# Patient Record
Sex: MALE | Race: WHITE | NOT HISPANIC OR LATINO | Employment: FULL TIME | URBAN - METROPOLITAN AREA
[De-identification: names, ages, dates, MRNs, and addresses within clinical notes are randomized per-mention and may not be internally consistent; named-entity substitution may affect disease eponyms.]

---

## 2017-08-10 ENCOUNTER — OFFICE VISIT - HEALTHEAST (OUTPATIENT)
Dept: FAMILY MEDICINE | Facility: CLINIC | Age: 37
End: 2017-08-10

## 2017-08-10 DIAGNOSIS — K21.9 GERD (GASTROESOPHAGEAL REFLUX DISEASE): ICD-10-CM

## 2017-08-10 DIAGNOSIS — R03.0 ELEVATED BLOOD PRESSURE READING WITHOUT DIAGNOSIS OF HYPERTENSION: ICD-10-CM

## 2017-08-10 DIAGNOSIS — R06.83 SNORING: ICD-10-CM

## 2017-08-10 DIAGNOSIS — E66.9 OBESITY (BMI 30-39.9): ICD-10-CM

## 2017-08-10 RX ORDER — LANSOPRAZOLE 30 MG/1
30 CAPSULE, DELAYED RELEASE ORAL DAILY PRN
Status: SHIPPED | COMMUNITY
Start: 2017-08-10 | End: 2022-10-31

## 2018-09-05 ENCOUNTER — OFFICE VISIT - HEALTHEAST (OUTPATIENT)
Dept: FAMILY MEDICINE | Facility: CLINIC | Age: 38
End: 2018-09-05

## 2018-09-05 DIAGNOSIS — S40.812A: ICD-10-CM

## 2018-09-05 DIAGNOSIS — S46.912A LEFT SHOULDER STRAIN, INITIAL ENCOUNTER: ICD-10-CM

## 2018-09-05 DIAGNOSIS — S30.811A ABRASION OF ABDOMINAL WALL: ICD-10-CM

## 2018-10-10 ENCOUNTER — OFFICE VISIT - HEALTHEAST (OUTPATIENT)
Dept: FAMILY MEDICINE | Facility: CLINIC | Age: 38
End: 2018-10-10

## 2018-10-10 DIAGNOSIS — H93.11 TINNITUS, RIGHT: ICD-10-CM

## 2018-10-10 DIAGNOSIS — R03.0 ELEVATED BLOOD PRESSURE READING WITHOUT DIAGNOSIS OF HYPERTENSION: ICD-10-CM

## 2018-10-10 DIAGNOSIS — H69.91 DYSFUNCTION OF RIGHT EUSTACHIAN TUBE: ICD-10-CM

## 2018-10-17 ENCOUNTER — AMBULATORY - HEALTHEAST (OUTPATIENT)
Dept: NURSING | Facility: CLINIC | Age: 38
End: 2018-10-17

## 2018-10-30 ENCOUNTER — OFFICE VISIT - HEALTHEAST (OUTPATIENT)
Dept: FAMILY MEDICINE | Facility: CLINIC | Age: 38
End: 2018-10-30

## 2018-10-30 DIAGNOSIS — Z13.220 LIPID SCREENING: ICD-10-CM

## 2018-10-30 DIAGNOSIS — I10 BENIGN ESSENTIAL HYPERTENSION: ICD-10-CM

## 2018-10-30 DIAGNOSIS — R31.29 MICROSCOPIC HEMATURIA: ICD-10-CM

## 2018-10-30 LAB
ALBUMIN SERPL-MCNC: 4.2 G/DL (ref 3.5–5)
ALBUMIN UR-MCNC: NEGATIVE MG/DL
ALP SERPL-CCNC: 58 U/L (ref 45–120)
ALT SERPL W P-5'-P-CCNC: 34 U/L (ref 0–45)
ANION GAP SERPL CALCULATED.3IONS-SCNC: 8 MMOL/L (ref 5–18)
APPEARANCE UR: CLEAR
AST SERPL W P-5'-P-CCNC: 27 U/L (ref 0–40)
BACTERIA #/AREA URNS HPF: ABNORMAL HPF
BILIRUB SERPL-MCNC: 0.5 MG/DL (ref 0–1)
BILIRUB UR QL STRIP: NEGATIVE
BUN SERPL-MCNC: 12 MG/DL (ref 8–22)
CALCIUM SERPL-MCNC: 9.7 MG/DL (ref 8.5–10.5)
CHLORIDE BLD-SCNC: 105 MMOL/L (ref 98–107)
CO2 SERPL-SCNC: 26 MMOL/L (ref 22–31)
COLOR UR AUTO: YELLOW
CREAT SERPL-MCNC: 0.95 MG/DL (ref 0.7–1.3)
GFR SERPL CREATININE-BSD FRML MDRD: >60 ML/MIN/1.73M2
GLUCOSE BLD-MCNC: 91 MG/DL (ref 70–125)
GLUCOSE UR STRIP-MCNC: NEGATIVE MG/DL
HBA1C MFR BLD: 5.7 % (ref 3.5–6)
HGB UR QL STRIP: ABNORMAL
KETONES UR STRIP-MCNC: NEGATIVE MG/DL
LEUKOCYTE ESTERASE UR QL STRIP: NEGATIVE
NITRATE UR QL: NEGATIVE
PH UR STRIP: 6 [PH] (ref 5–8)
POTASSIUM BLD-SCNC: 4.2 MMOL/L (ref 3.5–5)
PROT SERPL-MCNC: 6.9 G/DL (ref 6–8)
RBC #/AREA URNS AUTO: ABNORMAL HPF
SODIUM SERPL-SCNC: 139 MMOL/L (ref 136–145)
SP GR UR STRIP: 1.02 (ref 1–1.03)
SQUAMOUS #/AREA URNS AUTO: ABNORMAL LPF
UROBILINOGEN UR STRIP-ACNC: ABNORMAL
WBC #/AREA URNS AUTO: ABNORMAL HPF

## 2018-10-30 ASSESSMENT — MIFFLIN-ST. JEOR: SCORE: 2218.27

## 2018-11-05 ENCOUNTER — COMMUNICATION - HEALTHEAST (OUTPATIENT)
Dept: FAMILY MEDICINE | Facility: CLINIC | Age: 38
End: 2018-11-05

## 2018-11-13 ENCOUNTER — AMBULATORY - HEALTHEAST (OUTPATIENT)
Dept: NURSING | Facility: CLINIC | Age: 38
End: 2018-11-13

## 2018-11-13 ENCOUNTER — AMBULATORY - HEALTHEAST (OUTPATIENT)
Dept: LAB | Facility: CLINIC | Age: 38
End: 2018-11-13

## 2018-11-13 DIAGNOSIS — I10 BENIGN ESSENTIAL HYPERTENSION: ICD-10-CM

## 2018-11-13 DIAGNOSIS — R31.29 MICROSCOPIC HEMATURIA: ICD-10-CM

## 2018-11-13 DIAGNOSIS — Z13.220 LIPID SCREENING: ICD-10-CM

## 2018-11-13 LAB
ALBUMIN UR-MCNC: NEGATIVE MG/DL
ANION GAP SERPL CALCULATED.3IONS-SCNC: 10 MMOL/L (ref 5–18)
APPEARANCE UR: CLEAR
BACTERIA #/AREA URNS HPF: ABNORMAL HPF
BILIRUB UR QL STRIP: NEGATIVE
BUN SERPL-MCNC: 13 MG/DL (ref 8–22)
CALCIUM SERPL-MCNC: 9.5 MG/DL (ref 8.5–10.5)
CHLORIDE BLD-SCNC: 104 MMOL/L (ref 98–107)
CHOLEST SERPL-MCNC: 201 MG/DL
CO2 SERPL-SCNC: 26 MMOL/L (ref 22–31)
COLOR UR AUTO: YELLOW
CREAT SERPL-MCNC: 1.02 MG/DL (ref 0.7–1.3)
FASTING STATUS PATIENT QL REPORTED: YES
GFR SERPL CREATININE-BSD FRML MDRD: >60 ML/MIN/1.73M2
GLUCOSE BLD-MCNC: 88 MG/DL (ref 70–125)
GLUCOSE UR STRIP-MCNC: NEGATIVE MG/DL
HDLC SERPL-MCNC: 40 MG/DL
HGB UR QL STRIP: ABNORMAL
KETONES UR STRIP-MCNC: NEGATIVE MG/DL
LDLC SERPL CALC-MCNC: 144 MG/DL
LEUKOCYTE ESTERASE UR QL STRIP: NEGATIVE
MUCOUS THREADS #/AREA URNS LPF: ABNORMAL LPF
NITRATE UR QL: NEGATIVE
PH UR STRIP: 5.5 [PH] (ref 5–8)
POTASSIUM BLD-SCNC: 4.4 MMOL/L (ref 3.5–5)
RBC #/AREA URNS AUTO: ABNORMAL HPF
SODIUM SERPL-SCNC: 140 MMOL/L (ref 136–145)
SP GR UR STRIP: 1.02 (ref 1–1.03)
SQUAMOUS #/AREA URNS AUTO: ABNORMAL LPF
TRIGL SERPL-MCNC: 85 MG/DL
UROBILINOGEN UR STRIP-ACNC: ABNORMAL
WBC #/AREA URNS AUTO: ABNORMAL HPF

## 2018-11-14 ENCOUNTER — AMBULATORY - HEALTHEAST (OUTPATIENT)
Dept: FAMILY MEDICINE | Facility: CLINIC | Age: 38
End: 2018-11-14

## 2018-11-14 ENCOUNTER — COMMUNICATION - HEALTHEAST (OUTPATIENT)
Dept: FAMILY MEDICINE | Facility: CLINIC | Age: 38
End: 2018-11-14

## 2018-11-14 DIAGNOSIS — R31.29 MICROSCOPIC HEMATURIA: ICD-10-CM

## 2018-11-14 LAB — PSA SERPL-MCNC: 0.4 NG/ML (ref 0–2.5)

## 2018-11-15 LAB
LAB AP CHARGES (HE HISTORICAL CONVERSION): NORMAL
LAB MED GENERAL PATH INTERP (HE HISTORICAL CONVERSION): NORMAL
PATH REPORT.COMMENTS IMP SPEC: NORMAL
PATH REPORT.FINAL DX SPEC: NORMAL
PATH REPORT.MICROSCOPIC SPEC OTHER STN: NORMAL
PATH REPORT.RELEVANT HX SPEC: NORMAL
RESULT FLAG (HE HISTORICAL CONVERSION): NORMAL
SPECIMEN DESCRIPTION: NORMAL

## 2018-11-19 ENCOUNTER — COMMUNICATION - HEALTHEAST (OUTPATIENT)
Dept: FAMILY MEDICINE | Facility: CLINIC | Age: 38
End: 2018-11-19

## 2018-11-19 ENCOUNTER — AMBULATORY - HEALTHEAST (OUTPATIENT)
Dept: FAMILY MEDICINE | Facility: CLINIC | Age: 38
End: 2018-11-19

## 2018-11-19 DIAGNOSIS — R31.29 MICROSCOPIC HEMATURIA: ICD-10-CM

## 2018-11-21 ENCOUNTER — AMBULATORY - HEALTHEAST (OUTPATIENT)
Dept: NURSING | Facility: CLINIC | Age: 38
End: 2018-11-21

## 2018-11-21 ENCOUNTER — COMMUNICATION - HEALTHEAST (OUTPATIENT)
Dept: FAMILY MEDICINE | Facility: CLINIC | Age: 38
End: 2018-11-21

## 2018-12-18 ENCOUNTER — RECORDS - HEALTHEAST (OUTPATIENT)
Dept: ADMINISTRATIVE | Facility: OTHER | Age: 38
End: 2018-12-18

## 2018-12-29 ENCOUNTER — COMMUNICATION - HEALTHEAST (OUTPATIENT)
Dept: FAMILY MEDICINE | Facility: CLINIC | Age: 38
End: 2018-12-29

## 2019-02-05 ENCOUNTER — OFFICE VISIT - HEALTHEAST (OUTPATIENT)
Dept: FAMILY MEDICINE | Facility: CLINIC | Age: 39
End: 2019-02-05

## 2019-02-05 ENCOUNTER — COMMUNICATION - HEALTHEAST (OUTPATIENT)
Dept: FAMILY MEDICINE | Facility: CLINIC | Age: 39
End: 2019-02-05

## 2019-02-05 DIAGNOSIS — J01.90 ACUTE SINUSITIS WITH SYMPTOMS GREATER THAN 10 DAYS: ICD-10-CM

## 2019-02-12 ENCOUNTER — COMMUNICATION - HEALTHEAST (OUTPATIENT)
Dept: FAMILY MEDICINE | Facility: CLINIC | Age: 39
End: 2019-02-12

## 2019-02-12 ENCOUNTER — AMBULATORY - HEALTHEAST (OUTPATIENT)
Dept: FAMILY MEDICINE | Facility: CLINIC | Age: 39
End: 2019-02-12

## 2019-02-13 ENCOUNTER — AMBULATORY - HEALTHEAST (OUTPATIENT)
Dept: NURSING | Facility: CLINIC | Age: 39
End: 2019-02-13

## 2019-04-03 ENCOUNTER — RECORDS - HEALTHEAST (OUTPATIENT)
Dept: ADMINISTRATIVE | Facility: OTHER | Age: 39
End: 2019-04-03

## 2019-05-15 ENCOUNTER — COMMUNICATION - HEALTHEAST (OUTPATIENT)
Dept: FAMILY MEDICINE | Facility: CLINIC | Age: 39
End: 2019-05-15

## 2019-05-15 DIAGNOSIS — I10 BENIGN ESSENTIAL HYPERTENSION: ICD-10-CM

## 2019-07-10 ENCOUNTER — COMMUNICATION - HEALTHEAST (OUTPATIENT)
Dept: FAMILY MEDICINE | Facility: CLINIC | Age: 39
End: 2019-07-10

## 2019-07-10 DIAGNOSIS — I10 BENIGN ESSENTIAL HYPERTENSION: ICD-10-CM

## 2019-09-09 ENCOUNTER — OFFICE VISIT - HEALTHEAST (OUTPATIENT)
Dept: FAMILY MEDICINE | Facility: CLINIC | Age: 39
End: 2019-09-09

## 2019-09-09 DIAGNOSIS — N52.2 DRUG-INDUCED ERECTILE DYSFUNCTION: ICD-10-CM

## 2019-09-09 DIAGNOSIS — R31.29 MICROSCOPIC HEMATURIA: ICD-10-CM

## 2019-09-09 DIAGNOSIS — Z00.01 ENCOUNTER FOR GENERAL ADULT MEDICAL EXAMINATION WITH ABNORMAL FINDINGS: ICD-10-CM

## 2019-09-09 DIAGNOSIS — I10 ESSENTIAL HYPERTENSION: ICD-10-CM

## 2019-09-09 DIAGNOSIS — R06.83 SNORING: ICD-10-CM

## 2019-09-09 DIAGNOSIS — E66.9 OBESITY (BMI 30-39.9): ICD-10-CM

## 2019-09-09 DIAGNOSIS — I10 BENIGN ESSENTIAL HYPERTENSION: ICD-10-CM

## 2019-09-09 DIAGNOSIS — D35.00 ADRENAL ADENOMA, UNSPECIFIED LATERALITY: ICD-10-CM

## 2019-09-09 LAB
ALBUMIN SERPL-MCNC: 4.1 G/DL (ref 3.5–5)
ALBUMIN UR-MCNC: NEGATIVE MG/DL
ALP SERPL-CCNC: 56 U/L (ref 45–120)
ALT SERPL W P-5'-P-CCNC: 33 U/L (ref 0–45)
AMORPH CRY #/AREA URNS HPF: ABNORMAL /[HPF]
ANION GAP SERPL CALCULATED.3IONS-SCNC: 10 MMOL/L (ref 5–18)
APPEARANCE UR: CLEAR
AST SERPL W P-5'-P-CCNC: 24 U/L (ref 0–40)
ATRIAL RATE - MUSE: 71 BPM
BACTERIA #/AREA URNS HPF: ABNORMAL HPF
BILIRUB SERPL-MCNC: 0.3 MG/DL (ref 0–1)
BILIRUB UR QL STRIP: NEGATIVE
BUN SERPL-MCNC: 13 MG/DL (ref 8–22)
CALCIUM SERPL-MCNC: 9.3 MG/DL (ref 8.5–10.5)
CHLORIDE BLD-SCNC: 106 MMOL/L (ref 98–107)
CHOLEST SERPL-MCNC: 205 MG/DL
CO2 SERPL-SCNC: 25 MMOL/L (ref 22–31)
COLOR UR AUTO: YELLOW
CORTIS SERPL-MCNC: 3.5 UG/DL
CREAT SERPL-MCNC: 1.19 MG/DL (ref 0.7–1.3)
DIASTOLIC BLOOD PRESSURE - MUSE: NORMAL MMHG
ERYTHROCYTE [DISTWIDTH] IN BLOOD BY AUTOMATED COUNT: 12.2 % (ref 11–14.5)
FASTING STATUS PATIENT QL REPORTED: ABNORMAL
GFR SERPL CREATININE-BSD FRML MDRD: >60 ML/MIN/1.73M2
GLUCOSE BLD-MCNC: 88 MG/DL (ref 70–125)
GLUCOSE UR STRIP-MCNC: NEGATIVE MG/DL
HBA1C MFR BLD: 5.4 % (ref 3.5–6)
HCT VFR BLD AUTO: 42.9 % (ref 40–54)
HDLC SERPL-MCNC: 47 MG/DL
HGB BLD-MCNC: 14.7 G/DL (ref 14–18)
HGB UR QL STRIP: ABNORMAL
INTERPRETATION ECG - MUSE: NORMAL
KETONES UR STRIP-MCNC: ABNORMAL MG/DL
LDLC SERPL CALC-MCNC: 133 MG/DL
LEUKOCYTE ESTERASE UR QL STRIP: NEGATIVE
MCH RBC QN AUTO: 29.9 PG (ref 27–34)
MCHC RBC AUTO-ENTMCNC: 34.2 G/DL (ref 32–36)
MCV RBC AUTO: 87 FL (ref 80–100)
MUCOUS THREADS #/AREA URNS LPF: ABNORMAL LPF
NITRATE UR QL: NEGATIVE
P AXIS - MUSE: 22 DEGREES
PH UR STRIP: 5.5 [PH] (ref 5–8)
PLATELET # BLD AUTO: 228 THOU/UL (ref 140–440)
PMV BLD AUTO: 6.9 FL (ref 7–10)
POTASSIUM BLD-SCNC: 4.3 MMOL/L (ref 3.5–5)
PR INTERVAL - MUSE: 146 MS
PROT SERPL-MCNC: 6.9 G/DL (ref 6–8)
QRS DURATION - MUSE: 94 MS
QT - MUSE: 392 MS
QTC - MUSE: 425 MS
R AXIS - MUSE: 25 DEGREES
RBC # BLD AUTO: 4.92 MILL/UL (ref 4.4–6.2)
RBC #/AREA URNS AUTO: ABNORMAL HPF
SODIUM SERPL-SCNC: 141 MMOL/L (ref 136–145)
SP GR UR STRIP: 1.02 (ref 1–1.03)
SQUAMOUS #/AREA URNS AUTO: ABNORMAL LPF
SYSTOLIC BLOOD PRESSURE - MUSE: NORMAL MMHG
T AXIS - MUSE: 34 DEGREES
TRIGL SERPL-MCNC: 125 MG/DL
UROBILINOGEN UR STRIP-ACNC: ABNORMAL
VENTRICULAR RATE- MUSE: 71 BPM
WBC #/AREA URNS AUTO: ABNORMAL HPF
WBC: 6.1 THOU/UL (ref 4–11)

## 2019-09-09 ASSESSMENT — MIFFLIN-ST. JEOR: SCORE: 2265.11

## 2019-10-01 ENCOUNTER — OFFICE VISIT - HEALTHEAST (OUTPATIENT)
Dept: SLEEP MEDICINE | Facility: CLINIC | Age: 39
End: 2019-10-01

## 2019-10-01 DIAGNOSIS — R29.818 SUSPECTED SLEEP APNEA: ICD-10-CM

## 2019-10-01 DIAGNOSIS — R06.83 SNORING: ICD-10-CM

## 2019-10-01 DIAGNOSIS — R53.82 CHRONIC FATIGUE: ICD-10-CM

## 2019-10-01 DIAGNOSIS — E66.01 MORBID OBESITY (H): ICD-10-CM

## 2019-10-01 ASSESSMENT — MIFFLIN-ST. JEOR: SCORE: 2269.31

## 2019-10-07 ENCOUNTER — COMMUNICATION - HEALTHEAST (OUTPATIENT)
Dept: FAMILY MEDICINE | Facility: CLINIC | Age: 39
End: 2019-10-07

## 2019-10-07 DIAGNOSIS — I10 BENIGN ESSENTIAL HYPERTENSION: ICD-10-CM

## 2019-10-21 ENCOUNTER — RECORDS - HEALTHEAST (OUTPATIENT)
Dept: SLEEP MEDICINE | Facility: CLINIC | Age: 39
End: 2019-10-21

## 2019-10-21 ENCOUNTER — RECORDS - HEALTHEAST (OUTPATIENT)
Dept: ADMINISTRATIVE | Facility: OTHER | Age: 39
End: 2019-10-21

## 2019-10-21 DIAGNOSIS — E66.01 MORBID (SEVERE) OBESITY DUE TO EXCESS CALORIES (H): ICD-10-CM

## 2019-10-21 DIAGNOSIS — R53.82 CHRONIC FATIGUE, UNSPECIFIED: ICD-10-CM

## 2019-10-21 DIAGNOSIS — R06.83 SNORING: ICD-10-CM

## 2019-10-21 DIAGNOSIS — R29.818 OTHER SYMPTOMS AND SIGNS INVOLVING THE NERVOUS SYSTEM: ICD-10-CM

## 2019-10-23 ENCOUNTER — COMMUNICATION - HEALTHEAST (OUTPATIENT)
Dept: SLEEP MEDICINE | Facility: CLINIC | Age: 39
End: 2019-10-23

## 2019-11-14 ENCOUNTER — COMMUNICATION - HEALTHEAST (OUTPATIENT)
Dept: SLEEP MEDICINE | Facility: CLINIC | Age: 39
End: 2019-11-14

## 2019-11-14 ENCOUNTER — AMBULATORY - HEALTHEAST (OUTPATIENT)
Dept: SLEEP MEDICINE | Facility: CLINIC | Age: 39
End: 2019-11-14

## 2019-11-14 DIAGNOSIS — G47.33 OBSTRUCTIVE SLEEP APNEA: ICD-10-CM

## 2019-11-19 ENCOUNTER — AMBULATORY - HEALTHEAST (OUTPATIENT)
Dept: SLEEP MEDICINE | Facility: CLINIC | Age: 39
End: 2019-11-19

## 2019-12-11 ENCOUNTER — COMMUNICATION - HEALTHEAST (OUTPATIENT)
Dept: FAMILY MEDICINE | Facility: CLINIC | Age: 39
End: 2019-12-11

## 2019-12-11 DIAGNOSIS — I10 BENIGN ESSENTIAL HYPERTENSION: ICD-10-CM

## 2019-12-12 ENCOUNTER — COMMUNICATION - HEALTHEAST (OUTPATIENT)
Dept: FAMILY MEDICINE | Facility: CLINIC | Age: 39
End: 2019-12-12

## 2020-01-11 ENCOUNTER — COMMUNICATION - HEALTHEAST (OUTPATIENT)
Dept: FAMILY MEDICINE | Facility: CLINIC | Age: 40
End: 2020-01-11

## 2020-01-11 DIAGNOSIS — I10 BENIGN ESSENTIAL HYPERTENSION: ICD-10-CM

## 2020-01-14 ENCOUNTER — COMMUNICATION - HEALTHEAST (OUTPATIENT)
Dept: FAMILY MEDICINE | Facility: CLINIC | Age: 40
End: 2020-01-14

## 2020-01-14 DIAGNOSIS — I10 ESSENTIAL HYPERTENSION: ICD-10-CM

## 2020-01-29 ENCOUNTER — OFFICE VISIT - HEALTHEAST (OUTPATIENT)
Dept: SLEEP MEDICINE | Facility: CLINIC | Age: 40
End: 2020-01-29

## 2020-01-29 DIAGNOSIS — G47.33 OBSTRUCTIVE SLEEP APNEA: ICD-10-CM

## 2020-01-29 DIAGNOSIS — G47.10 HYPERSOMNIA: ICD-10-CM

## 2020-01-29 ASSESSMENT — MIFFLIN-ST. JEOR: SCORE: 2301.97

## 2020-01-30 ENCOUNTER — COMMUNICATION - HEALTHEAST (OUTPATIENT)
Dept: SLEEP MEDICINE | Facility: CLINIC | Age: 40
End: 2020-01-30

## 2020-01-30 DIAGNOSIS — G47.33 OBSTRUCTIVE SLEEP APNEA: ICD-10-CM

## 2020-06-26 ENCOUNTER — OFFICE VISIT - HEALTHEAST (OUTPATIENT)
Dept: FAMILY MEDICINE | Facility: CLINIC | Age: 40
End: 2020-06-26

## 2020-06-26 DIAGNOSIS — S46.812A STRAIN OF SUPRASPINATUS MUSCLE OR TENDON, LEFT, INITIAL ENCOUNTER: ICD-10-CM

## 2020-06-26 DIAGNOSIS — M75.02 ADHESIVE CAPSULITIS OF LEFT SHOULDER: ICD-10-CM

## 2020-06-26 NOTE — ASSESSMENT & PLAN NOTE
Pain starting with uncomfortable movement.  Ongoing. Exam with adhesive capsulitis. Unlikely to be bony abnormality.  We will start with anti-inflammatories and PT/OT.  If not improving consider xray and/or injection (or referral to ortho).

## 2020-07-01 ENCOUNTER — OFFICE VISIT - HEALTHEAST (OUTPATIENT)
Dept: PHYSICAL THERAPY | Facility: REHABILITATION | Age: 40
End: 2020-07-01

## 2020-07-01 DIAGNOSIS — S46.812S STRAIN OF SUPRASPINATUS MUSCLE OR TENDON, LEFT, SEQUELA: ICD-10-CM

## 2020-07-01 DIAGNOSIS — M75.00 ADHESIVE CAPSULITIS OF SHOULDER, UNSPECIFIED LATERALITY: ICD-10-CM

## 2020-09-28 ENCOUNTER — COMMUNICATION - HEALTHEAST (OUTPATIENT)
Dept: FAMILY MEDICINE | Facility: CLINIC | Age: 40
End: 2020-09-28

## 2020-09-28 DIAGNOSIS — I10 BENIGN ESSENTIAL HYPERTENSION: ICD-10-CM

## 2020-10-02 ENCOUNTER — AMBULATORY - HEALTHEAST (OUTPATIENT)
Dept: LAB | Facility: CLINIC | Age: 40
End: 2020-10-02

## 2020-10-02 DIAGNOSIS — I10 ESSENTIAL HYPERTENSION: ICD-10-CM

## 2020-10-02 LAB
ANION GAP SERPL CALCULATED.3IONS-SCNC: 10 MMOL/L (ref 5–18)
BUN SERPL-MCNC: 16 MG/DL (ref 8–22)
CALCIUM SERPL-MCNC: 9.2 MG/DL (ref 8.5–10.5)
CHLORIDE BLD-SCNC: 102 MMOL/L (ref 98–107)
CO2 SERPL-SCNC: 27 MMOL/L (ref 22–31)
CREAT SERPL-MCNC: 1.09 MG/DL (ref 0.7–1.3)
GFR SERPL CREATININE-BSD FRML MDRD: >60 ML/MIN/1.73M2
GLUCOSE BLD-MCNC: 113 MG/DL (ref 70–125)
POTASSIUM BLD-SCNC: 4 MMOL/L (ref 3.5–5)
SODIUM SERPL-SCNC: 139 MMOL/L (ref 136–145)

## 2021-01-02 ENCOUNTER — COMMUNICATION - HEALTHEAST (OUTPATIENT)
Dept: SLEEP MEDICINE | Facility: CLINIC | Age: 41
End: 2021-01-02

## 2021-01-07 ENCOUNTER — COMMUNICATION - HEALTHEAST (OUTPATIENT)
Dept: FAMILY MEDICINE | Facility: CLINIC | Age: 41
End: 2021-01-07

## 2021-01-07 DIAGNOSIS — I10 BENIGN ESSENTIAL HYPERTENSION: ICD-10-CM

## 2021-01-08 RX ORDER — LOSARTAN POTASSIUM 100 MG/1
TABLET ORAL
Qty: 90 TABLET | Refills: 1 | Status: SHIPPED | OUTPATIENT
Start: 2021-01-08 | End: 2021-07-09

## 2021-01-29 DIAGNOSIS — G47.33 OBSTRUCTIVE SLEEP APNEA (ADULT) (PEDIATRIC): Primary | ICD-10-CM

## 2021-02-02 ENCOUNTER — DOCUMENTATION ONLY (OUTPATIENT)
Dept: SLEEP MEDICINE | Facility: CLINIC | Age: 41
End: 2021-02-02

## 2021-02-02 NOTE — PROGRESS NOTES
Patient came to virtual set up via telephone visit for mask fitting appointment on February 2, 2021. Patient requested to switch masks because N30I,Karishma locke,N20 . Discussed the following masks: N30I  Patient selected a Resmed Mask name: N30I Nasal mask size Standard fit pack.

## 2021-04-21 ENCOUNTER — OFFICE VISIT - HEALTHEAST (OUTPATIENT)
Dept: FAMILY MEDICINE | Facility: CLINIC | Age: 41
End: 2021-04-21

## 2021-04-21 DIAGNOSIS — J01.90 ACUTE SINUSITIS TREATED WITH ANTIBIOTICS IN THE PAST 60 DAYS: ICD-10-CM

## 2021-04-23 ENCOUNTER — COMMUNICATION - HEALTHEAST (OUTPATIENT)
Dept: FAMILY MEDICINE | Facility: CLINIC | Age: 41
End: 2021-04-23

## 2021-04-26 ENCOUNTER — COMMUNICATION - HEALTHEAST (OUTPATIENT)
Dept: FAMILY MEDICINE | Facility: CLINIC | Age: 41
End: 2021-04-26

## 2021-04-27 ENCOUNTER — COMMUNICATION - HEALTHEAST (OUTPATIENT)
Dept: FAMILY MEDICINE | Facility: CLINIC | Age: 41
End: 2021-04-27

## 2021-05-31 VITALS — BODY MASS INDEX: 36.18 KG/M2 | WEIGHT: 278 LBS

## 2021-06-01 NOTE — PROGRESS NOTES
Dear Dr. Mobley, Jasmin Royal, Do  [address] [place] [address]    Thank you for the opportunity to participate in the care of Mr. Shady Rocha.    He is a 39 y.o. male who comes to the clinic with a chief complaint of chronic fatigue that is been going on for 5 to 10 years.  The patient has been observed by his wife is having pauses in his breathing during sleep followed by loud snoring.  He showed me a recording of his snoring that he captured on his phone.  The patient's review of system is otherwise negative.     Ideal Sleep-Wake Cycle(devoid of societal pressure):    Patient would try to initiate sleep at around 11-12 at night with a sleep latency of 1-5 minutes. The patient would have 5 awakening. Final wake up time is around 7:30 AM.    Past Medical History  Past Medical History:   Diagnosis Date     Sinus infection 12/2016        Past Surgical History  Past Surgical History:   Procedure Laterality Date     DENTAL SURGERY          Meds  Current Outpatient Medications   Medication Sig Dispense Refill     doxazosin (CARDURA) 8 MG tablet Take 1 tablet (8 mg total) by mouth at bedtime. For blood pressure 30 tablet 1     lansoprazole (PREVACID) 30 MG capsule Take 30 mg by mouth daily as needed.       No current facility-administered medications for this visit.         Allergies  Sulfa (sulfonamide antibiotics)     Social History  Social History     Socioeconomic History     Marital status:      Spouse name: Not on file     Number of children: Not on file     Years of education: Not on file     Highest education level: Not on file   Occupational History     Not on file   Social Needs     Financial resource strain: Not on file     Food insecurity:     Worry: Not on file     Inability: Not on file     Transportation needs:     Medical: Not on file     Non-medical: Not on file   Tobacco Use     Smoking status: Never Smoker     Smokeless tobacco: Never Used   Substance and Sexual Activity      Alcohol use: Yes     Frequency: 2-3 times a week     Comment: 7 drinks per week     Drug use: Not on file     Sexual activity: Yes     Partners: Female   Lifestyle     Physical activity:     Days per week: Not on file     Minutes per session: Not on file     Stress: Not on file   Relationships     Social connections:     Talks on phone: Not on file     Gets together: Not on file     Attends Muslim service: Not on file     Active member of club or organization: Not on file     Attends meetings of clubs or organizations: Not on file     Relationship status: Not on file     Intimate partner violence:     Fear of current or ex partner: Not on file     Emotionally abused: Not on file     Physically abused: Not on file     Forced sexual activity: Not on file   Other Topics Concern     Not on file   Social History Narrative     Not on file        Family History  Family History   Problem Relation Age of Onset     Diabetes Paternal Grandmother      Hypertension Mother      Hypertension Father      Hypertension Maternal Grandmother      Coronary artery disease Maternal Grandfather      Hypertension Paternal Grandfather         Review of Systems:  Constitutional: Negative except as noted in HPI.   Eyes: Negative except as noted in HPI.   ENT: Negative except as noted in HPI.   Cardiovascular: Negative except as noted in HPI.   Respiratory: Negative except as noted in HPI.   Gastrointestinal: Negative except as noted in HPI.   Genitourinary: Negative except as noted in HPI.   Musculoskeletal: Negative except as noted in HPI.   Integumentary: Negative except as noted in HPI.   Neurological: Negative except as noted in HPI.   Psychiatric: Negative except as noted in HPI.   Endocrine: Negative except as noted in HPI.   Hematologic/Lymphatic: Negative except as noted in HPI.      STOP BANG 10/1/2019   Do you snore loudly (louder than talking or loud enough to be heard through closed doors)? 1   Do you often feel tired, fatigued,  "or sleepy during daytime? 0   Has anyone observed you stop breathing in your sleep? 0   Do you have or are you being treated for high blood pressure? 1   BMI more than 35 kg/m2 1   Age over 50 years old? 0   Neck circumference greater than 16 inches? 0   Gender male? 1   Total Score 4     Epworths Sleepiness Scale 10/1/2019   Sitting and reading 3   Watching TV 2   Sitting, inactive in a public place (e.g. a theatre or a meeting) 0   As a passenger in a car for an hour without a break 1   Lying down to rest in the afternoon when circumstances permit 2   Sitting and talking to someone 0   Sitting quietly after a lunch without alcohol 1   In a car, while stopped for a few minutes in traffic 0   Total score 9     Rooming 10/1/2019   Usual bedtime 1030   Sleep Latency 1-5mins   Awakenings 5   Wake Up Time 1030pm-7am   Weekends 11,   Energy Drinks n   Coffee x   Cola x   Difficulty falling asleep No   Difficulty staying asleep No   Excessive daytime tiredness No   Excessive daytime sleepiness No   Dozing off while driving No   Shift Worker No   Sleep Walking? No   Sleep Talking? No   Kicking or punching? No   Restless legs symptoms No       Physical Exam:  BP (!) 134/96 (Patient Site: Right Arm, Patient Position: Sitting, Cuff Size: Adult Large)   Pulse 84   Ht 6' 1\" (1.854 m)   Wt (!) 290 lb (131.5 kg)   SpO2 95%   BMI 38.26 kg/m    BMI:Body mass index is 38.26 kg/m .   GEN: NAD, obese  Head: Normocephalic.  EYES: PERRLA, EOMI  ENT: Oropharynx is clear, Marks class 4+ airway.   Nasal mucosa is moist without erythema  Neck : Thyroid is within normal limits. Neck Circ 16\"  CV: Regular rate and rhythm, S1 & S2 positive.  LUNGS: Bilateral breathsounds heard.   ABDOMEN: Positive bowel sounds in all quadrants, soft, no rebound or guarding  MUSCULOSKELETAL: Bilateral trace leg swelling  SKIN: warm, dry, no rashes  Neurological: Alert, oriented to time, place, and person.  Psych: normal mood, normal affect   "   Labs/Studies:     Lab Results   Component Value Date    WBC 6.1 09/09/2019    HGB 14.7 09/09/2019    HCT 42.9 09/09/2019    MCV 87 09/09/2019     09/09/2019         Chemistry        Component Value Date/Time     09/09/2019 1151    K 4.3 09/09/2019 1151     09/09/2019 1151    CO2 25 09/09/2019 1151    BUN 13 09/09/2019 1151    CREATININE 1.19 09/09/2019 1151    GLU 88 09/09/2019 1151        Component Value Date/Time    CALCIUM 9.3 09/09/2019 1151    ALKPHOS 56 09/09/2019 1151    AST 24 09/09/2019 1151    ALT 33 09/09/2019 1151    BILITOT 0.3 09/09/2019 1151            No results found for: FERRITIN  No results found for: TSH      Assessment and Plan:  In summary Shady Rocha is a 39 y.o. year old male here for sleep disturbance.  1.  Suspected sleep apnea   Mr. Shady Rocha has high risk for obstructive sleep apnea based on the history of witnessed apnea, snoring and a crowded airway. I educated the patient on the underlying pathophysiology of obstructive sleep apnea. We reviewed the risks associated with sleep apnea, including increased cardiovascular risk and overall death. We talked about treatments briefly. I recommend getting a Home sleep study. The patient should return to the clinic to discuss results and treatment option in a patient-centered approach.  2.  Chronic fatigue  3.  Snoring  4.  Morbid Obesity    Patient verbalized understanding of these issues, agrees with the plan and all questions were answered today. Patient was given an opportuntity to voice any other symptoms or concerns not listed above. Patient did not have any other symptoms or concerns.      Patient told to return in one week after the sleep study is interpreted.      Dominic Solano DO  Board Certified in Internal Medicine and Sleep Medicine  [facility].    (Note created with Dragon voice recognition and unintended spelling errors and word substitutions may occur)

## 2021-06-01 NOTE — PATIENT INSTRUCTIONS - HE
Follow-up with PCP regarding elevated blood pressure:   BP Readings from Last 3 Encounters:   10/01/19 (!) 134/96   09/09/19 (!) 151/100   02/13/19 125/89     What is a Home Sleep Study?    your doctor can give you a portable sleep monitor to use at home, so you don t have to spend the night in the sleep lab. But you should use a portable monitor only if:   ?Your doctor thinks you have a condition that makes you stop breathing for short periods while you are asleep, called  sleep apnea.    ?You do not have other serious medical problems, such as heart disease or lung disease.    Please bring the home sleep study device back to the sleep center as soon as you are finished with it so we can score it.     The cost of care estimate line is [phone]. They are able to give the patient an estimate of the charges and also an estimate of their insurance coverage/patient responsibility.   After your sleep study is performed, please call us at [phone] to schedule for a follow up to review the results of the sleep study.

## 2021-06-01 NOTE — PROGRESS NOTES
MALE ADULT PREVENTIVE EXAM    CHIEF COMPLAINT:  Male preventive exam.    HISTORY OF PRESENT ILLNESS:  Shady Rocha is a 39 y.o. male.  He last saw me Visit date not found.  Following up on blood pressure. Has been doing full tab of cardura and hasnt been able to trial the ED side because been 2 weeks since increase. Could feel wear off on 2mg  Gets pain into upper left shoulder when feels wearing off.  Not sure if with exertion. Not having fatigue with current med. Not exercising with the run around. Used to walk   -usually no caffeine. Feels rested most mornings. Talked about sleep study and thinks should do it -elbowed by wife snores    -only takes the prevacid here and there. Only certain foods spark it.   -saw urology for hematuria. Normal, didn't finish 24h testing. Had follow up CT on adenoma which he says was stable   -step mom unexpected pass away. Did have ca - had seizure   -not having regular headaches or temperature changes   -2016 started seeing the increased blood pressure not sure that there was a big change in his weight at that time necessarily.  -Blood pressure machine was compared at today's visit and is pretty similar in comparison her readings.  He  has a past medical history of Sinus infection (12/2016).    Lab Results   Component Value Date    WBC 6.1 09/09/2019    HGB 14.7 09/09/2019    HCT 42.9 09/09/2019    MCV 87 09/09/2019     09/09/2019     09/09/2019    K 4.3 09/09/2019    BUN 13 09/09/2019     Lab Results   Component Value Date    CHOL 205 (H) 09/09/2019    HDL 47 09/09/2019    LDLCALC 133 (H) 09/09/2019    TRIG 125 09/09/2019     Lab Results   Component Value Date    PSA 0.4 11/13/2018     No results found for: TSH  BP Readings from Last 3 Encounters:   09/09/19 (!) 151/100   02/13/19 125/89   01/11/19 133/88       Surgeries:    Past Surgical History:   Procedure Laterality Date     DENTAL SURGERY         Family History:  His family history includes Coronary artery  disease in his maternal grandfather; Diabetes in his paternal grandmother; Hypertension in his father, maternal grandmother, mother, and paternal grandfather.    Social History:  He  reports that he has never smoked. He has never used smokeless tobacco. He reports that he drinks alcohol.    Medications:    Current Outpatient Medications:      doxazosin (CARDURA) 8 MG tablet, Take 1 tablet (8 mg total) by mouth at bedtime. For blood pressure, Disp: 30 tablet, Rfl: 1     lansoprazole (PREVACID) 30 MG capsule, Take 30 mg by mouth daily as needed., Disp: , Rfl:   HELD MEDICATIONS: None.    Allergies:  No latex allergies.  Allergies   Allergen Reactions     Sulfa (Sulfonamide Antibiotics)        RISK BEHAVIORS AND HEALTH HABITS:  PSA: The natural history of prostate cancer and ongoing controversy regarding screening and potential treatment outcomes of prostate cancer has been discussed with the patient. The meaning of a false positive PSA and a false negative PSA has been discussed. He indicates understanding of the limitations of this screening test.  Seat Belt Use: YES  Calcium intake/Osteoporosis prevention: YES  Guns: NO  Sun Screen: YES  Dental Care: YES    REVIEW OF SYSTEMS:  Complete head to toe review of systems is otherwise negative except as above.    OBJECTIVE:  VITAL SIGNS:    Vitals:    09/09/19 1147   BP: (!) 151/100   Pulse: 76   Resp: 20   Temp: 97.6  F (36.4  C)     GENERAL:  Patient alert, in NAD  EYES: PERRLA. Extraoccular movements intact, pupils equal, reactive to light and accommodation.  Normal conjunctiva and lids.  Undilated fudoscopic exam normal, including normal size, appearance cup-to-disc ratio.  Normal posterior segments, including no exudates or hemorrhages.  ENT:  Hearing grossly normal.  Normal appearance to ears and nose.  Bilateral TM s, external canals, oropharynx normal. Normal lips, gums and teeth.  Normal nasal mucosa, septum and turbinates.  NECK:  Supple, without thyromegaly or  mass.  RESP:  Clear to auscultation without crackles, wheezes or distress.  Normal respiratory effort.   BREASTS:  Nontender, without masses, nipple discharge, erythema, or axillary adenopathy.  CV:  Regular rate and rhythm without murmurs, rubs or gallops.  Normal carotid, abdominal aorta, femoral and pedal pulses.  No varicosities/trace edema lower extremities  ABDOMEN:  Soft, non-tender, without hepatosplenomegaly, masses, there is a small umbilical hernia that is reducible and nontender.    :  Normal scrotum.  Penis circumcised without lesions or discharge.  LYMPHATIC: Normal palpation of neck, groin and axilla..  No lymphadenopathy.  No bruising.  NEURO:  CN II-XII intact, motor & sensory function all intact.  DTR and reflexes normal.  PSYCHIATRIC:  Alert & oriented with normal mood and affect.  Good judgment and insight.  SKIN:  Normal inspection and palpation.  MUSCULOSKELETAL: Normal gait and station.  - Spine / Ribs / Pelvis: Normal inspection, ROM, stability and strength: Spine, Head, Neck, Upper and Lower Extremities.    Review of information reviewed urology note as well as release of information for CT scan of adrenal adenoma, reviewed EKG findings  Most Recent EKG     Units 09/09/19  1328   VENTRATE BPM 71   ATRIALRATE BPM 71   QRSDURATION ms 94   QTINTERVAL ms 392   QTCCALC ms 425   P [place] degrees 22   RAXIS degrees 25   TAXIS degrees 34   MUSEDX  Normal sinus rhythm  Normal ECG  No previous ECGs available  Confirmed by THEODORE RIVERS MD LOC:[facility] ([id number]) on 9/9/2019 4:08:44 PM         ASSESSMENT & PLAN  Shady was seen today for annual exam.    Diagnoses and all orders for this visit:    Encounter for general adult medical examination with abnormal findings -routine labs drawn at this time-for annual physical.  Given morbid obesity also checked A1c to evaluate for diabetes.  Discussed preventative immunizations.   See below for further instructions  -     Comprehensive Metabolic Panel  -      Glycosylated Hemoglobin A1c  -     HM2(CBC w/o Differential)  -     Lipid [place] FASTING    Benign essential hypertension-blood pressure has been poorly controlled and his home machine is very accurate 2 hours.  He has not tolerated several medications due to either peripheral edema with the amlodipine or erectile dysfunction with several of the medications.  We are trying to find one that does not have negative side effects of erectile dysfunction and has not been able to assess the doxazosin yet so far.  We will start him on increased dose of doxazosin 8 mg at bedtime and reevaluate within a couple weeks he can send me a message and let me know what his readings have been.  He may need an additional medication.  EKG performed because of family history and significant hypertension to ensure no other underlying cardiac concern.  He does get intermittent shoulder pain and we should pay attention to that if related to exertion then I would be quick to order stress testing on him  -     doxazosin (CARDURA) 8 MG tablet; Take 1 tablet (8 mg total) by mouth at bedtime. For blood pressure  -     Electrocardiogram Perform and Read    Obesity (BMI 30-39.9) -The following high BMI interventions were performed this visit: encouragement to exercise and lifestyle education regarding diet  He declined any other referrals for the time being for weight loss.  -     Ambulatory referral to Sleep Medicine    Adrenal adenoma, unspecified laterality -this is repeated with urology and I sent for release of information to get copy of that report b ack so we can assess if the adenoma has changed.  Will order cortisol to ensure that his hypertension is not related to this adrenal adenoma.  No concerns regarding the kidneys  -     Cortisol    Snoring-could be contributing to the excessive weight gain and hypertension.  He is finally willing to move forward with a sleep study  -     Ambulatory referral to Sleep Medicine    Microscopic  hematuria-evaluated with negative work-up by urology.  Continue to monitor.  No concerns at this time  -     Urinalysis-UC if Indicated    Drug-induced erectile dysfunction-see above.  Patient is fairly certain that it is drug-induced however we discussed with his weight and hypertension that that could be reason itself or why he is already having issues  Other orders  -     Influenza,Seasonal,Quad,INJ =/>6months    Needs to follow-up at least every 6 months for blood pressure but sooner until we get it under control  General  Immunizations reviewed and updated .  Alcohol use, safety and moderation discussed.  Recommended adequate calcium intake/osteoporosis prevention.  Discussed colon cancer screening at age 50, 45 if -American.  Diet and exercise reviewed, including goal of aerobic exercise 30-90 minutes most days of the week, moderation of portions sizes, avoiding eating out and fast food and increase in fruits and vegetables.  Discussed safe sex practices.  Discussed & recommended seat belt (& motorcycle helmet) use.  Discussed & recommended smoke detector.  Discussed sun protection.  Discussed weight management.

## 2021-06-02 VITALS — WEIGHT: 279 LBS | BODY MASS INDEX: 36.31 KG/M2

## 2021-06-02 VITALS — BODY MASS INDEX: 36.7 KG/M2 | WEIGHT: 282 LBS

## 2021-06-02 VITALS — HEIGHT: 74 IN | WEIGHT: 277 LBS | BODY MASS INDEX: 35.55 KG/M2

## 2021-06-02 NOTE — TELEPHONE ENCOUNTER
The overnight polysomnography was reviewed.   The patient had obstructive sleep apnea.    I have called the patient and informed his of the results. I offered the patient the option of getting started on pressure therapy and the patient agreed. The patient would like to use [employer] as his durable medical equipment company.    The prescription is in the chart.    Contact information for [employer] company:    [employer] Tel: [phone]    Thank you.

## 2021-06-03 VITALS
HEIGHT: 73 IN | WEIGHT: 288.2 LBS | TEMPERATURE: 97.6 F | BODY MASS INDEX: 38.2 KG/M2 | SYSTOLIC BLOOD PRESSURE: 151 MMHG | RESPIRATION RATE: 20 BRPM | DIASTOLIC BLOOD PRESSURE: 100 MMHG | HEART RATE: 76 BPM

## 2021-06-03 VITALS
BODY MASS INDEX: 38.43 KG/M2 | OXYGEN SATURATION: 95 % | DIASTOLIC BLOOD PRESSURE: 96 MMHG | WEIGHT: 290 LBS | SYSTOLIC BLOOD PRESSURE: 134 MMHG | HEIGHT: 73 IN | HEART RATE: 84 BPM

## 2021-06-03 NOTE — PROGRESS NOTES
Patient was offered choice of vendor and chose [facility].  Patient Shady Rocha was set up at [facility] on 11/19/19. Patient received a Resmed [id number] Auto. Pressures were set at 5-20.   Patient s ramp is 5 cm H2O for off and FLEX/EPR is EPR 2.  Patient received a FP Mask name: [email or web address]  nasal Size med, heated tubing and heated humidifier.    Pacee Her

## 2021-06-03 NOTE — PROGRESS NOTES
Order for Durable Medical Equipment was processed and equipment ordered.     DME provider: [facility]    Date Faxed: 11/14/2019    Ordering Provider: Dominic Solano DO    PAP Order Type: New CPAP Order    Fax Number: IN HOUSE DME: [id number]

## 2021-06-04 VITALS
WEIGHT: 297.2 LBS | DIASTOLIC BLOOD PRESSURE: 83 MMHG | SYSTOLIC BLOOD PRESSURE: 131 MMHG | HEIGHT: 73 IN | OXYGEN SATURATION: 97 % | HEART RATE: 83 BPM | BODY MASS INDEX: 39.39 KG/M2

## 2021-06-04 VITALS
WEIGHT: 282.4 LBS | OXYGEN SATURATION: 97 % | BODY MASS INDEX: 37.26 KG/M2 | SYSTOLIC BLOOD PRESSURE: 132 MMHG | DIASTOLIC BLOOD PRESSURE: 89 MMHG | TEMPERATURE: 97.5 F | RESPIRATION RATE: 10 BRPM | HEART RATE: 60 BPM

## 2021-06-04 NOTE — TELEPHONE ENCOUNTER
RN cannot approve Refill Request    RN can NOT refill this medication Protocol failed and NO refill given. Last office visit: 10/30/2018 Jasmin Mobley DO Last Physical: 9/9/2019 Last MTM visit: Visit date not found Last visit same specialty: 10/30/2018 Jasmin Mobley DO.  Next visit within 3 mo: Visit date not found  Next physical within 3 mo: Visit date not found  Last refill:  11/14/19      [facility]ou Mo, [facility] Triage/Med Refill 12/11/2019    Requested Prescriptions   Pending Prescriptions Disp Refills     losartan (COZAAR) 100 MG tablet [Pharmacy Med Name: LOSARTAN 100MG TABLETS] 30 tablet 0     Sig: TAKE 1 TABLET(100 MG) BY MOUTH DAILY FOR BLOOD PRESSURE       Angiotensin Receptor Blocker Protocol Passed - 12/11/2019  5:08 PM        Passed - PCP or prescribing provider visit in past 12 months       Last office visit with prescriber/PCP: 10/30/2018 Jasmin Mobley DO OR same dept: Visit date not found OR same specialty: 10/30/2018 Jasmin Mobley DO  Last physical: 9/9/2019 Last MTM visit: Visit date not found   Next visit within 3 mo: Visit date not found  Next physical within 3 mo: Visit date not found  Prescriber OR PCP: Jasmin Mobley DO  Last diagnosis associated with med order: 1. Benign essential hypertension  - losartan (COZAAR) 100 MG tablet [Pharmacy Med Name: LOSARTAN 100MG TABLETS]; TAKE 1 TABLET(100 MG) BY MOUTH DAILY FOR BLOOD PRESSURE  Dispense: 30 tablet; Refill: 0    If protocol passes may refill for 12 months if within 3 months of last provider visit (or a total of 15 months).             Passed - Serum potassium within the past 12 months     Lab Results   Component Value Date    Potassium 4.3 09/09/2019             Passed - Blood pressure filed in past 12 months     BP Readings from Last 1 Encounters:   10/01/19 (!) 134/96             Passed - Serum creatinine within the past 12 months     Creatinine   Date Value Ref Range Status   09/09/2019 1.19  0.70 - 1.30 mg/dL Final

## 2021-06-05 NOTE — TELEPHONE ENCOUNTER
An order for durable medical equipment was processed, and equipment has been ordered. See details below:    Date: 1/30/2020    Equipment Ordered: CPAP pressure change (clinic performed)    Company: [employer]     Fax: Nader Ramos ( [employer] [place] [employer] [facility])    Ordering Provider: Dominic Solano CMA 1/30/2020 11:24 AM

## 2021-06-05 NOTE — PROGRESS NOTES
Order for Durable Medical Equipment was processed and equipment ordered.     DME provider: [facility]    Date Faxed: 1/29/2020    Ordering Provider: Dominic Solano DO    PAP Order Type: Setting/pressure change    Fax Number: Sent to [facility]

## 2021-06-05 NOTE — TELEPHONE ENCOUNTER
Refill Approved    Rx renewed per Medication Renewal Policy. Medication was last renewed on 12/12/2019.  Last OV 9/9/2019.    Carri Hanna, [facility] Triage/Med Refill 1/14/2020     Requested Prescriptions   Pending Prescriptions Disp Refills     losartan (COZAAR) 100 MG tablet [Pharmacy Med Name: LOSARTAN 100MG TABLETS] 30 tablet 0     Sig: TAKE 1 TABLET(100 MG) BY MOUTH DAILY FOR BLOOD PRESSURE       Angiotensin Receptor Blocker Protocol Passed - 1/11/2020  9:19 AM        Passed - PCP or prescribing provider visit in past 12 months       Last office visit with prescriber/PCP: 10/30/2018 Jasmin Mobley DO OR same dept: Visit date not found OR same specialty: 10/30/2018 Jasmin Mobley DO  Last physical: 9/9/2019 Last MTM visit: Visit date not found   Next visit within 3 mo: Visit date not found  Next physical within 3 mo: Visit date not found  Prescriber OR PCP: Jasmin Mobley DO  Last diagnosis associated with med order: 1. Benign essential hypertension  - losartan (COZAAR) 100 MG tablet [Pharmacy Med Name: LOSARTAN 100MG TABLETS]; TAKE 1 TABLET(100 MG) BY MOUTH DAILY FOR BLOOD PRESSURE  Dispense: 30 tablet; Refill: 0    If protocol passes may refill for 12 months if within 3 months of last provider visit (or a total of 15 months).             Passed - Serum potassium within the past 12 months     Lab Results   Component Value Date    Potassium 4.3 09/09/2019             Passed - Blood pressure filed in past 12 months     BP Readings from Last 1 Encounters:   10/01/19 (!) 134/96             Passed - Serum creatinine within the past 12 months     Creatinine   Date Value Ref Range Status   09/09/2019 1.19 0.70 - 1.30 mg/dL Final

## 2021-06-05 NOTE — PROGRESS NOTES
Dear Dr. Mobley, Jasmin Royal, DO  [address] [place] [address],    Thank you for the opportunity to participate in the care of Shady Rocha.     He is a 39 y.o.  male patient who comes to the sleep medicine clinic for review of his sleep study and compliance download data. The study was completed on 10/21/2019 which showed that the patient had severe obstructive sleep apnea with an apnea hypopnea index of 107.8 events per hour with the lowest O2 sat of 73%.  The patient was informed the results and offered the option to initiate pressure therapy.  Since starting pressure therapy the patient reports that his sleep quality and energy level have improved.    Assessment and Plan:  1. Obstructive sleep apnea  I reviewed the results of the patient's sleep study with him in detail.  I congratulated him on his excellent CPAP usage.  I will also decrease his pressure setting to a set pressure of 13.4 CWP.  I welcome the patient to follow-up with me every 2 years.  - CPAP DME [facility]    2. Hypersomnia  Improving    Compliance Download data for 30 days:  Pressure setting: APAP 5-20 CWP  Residual AHI: 6.2 events per hour  Leak: Minimal  Compliance: 97%  Mask Tolerance: Good  Skin irritation: None    Current Outpatient Medications   Medication Sig Dispense Refill     lansoprazole (PREVACID) 30 MG capsule Take 30 mg by mouth daily as needed.       losartan (COZAAR) 100 MG tablet TAKE 1 TABLET(100 MG) BY MOUTH DAILY FOR BLOOD PRESSURE 90 tablet 2     No current facility-administered medications for this visit.        Allergies   Allergen Reactions     Sulfa (Sulfonamide Antibiotics)        Epworths Sleepiness Scale 10/1/2019 1/29/2020   Sitting and reading 3 1   Watching TV 2 1   Sitting, inactive in a public place (e.g. a theatre or a meeting) 0 0   As a passenger in a car for an hour without a break 1 0   Lying down to rest in the afternoon when circumstances permit 2 1   Sitting and talking to someone  "0 0   Sitting quietly after a lunch without alcohol 1 0   In a car, while stopped for a few minutes in traffic 0 0   Total score 9 3       Physical Exam:  /83 (Patient Site: Right Arm, Patient Position: Sitting, Cuff Size: Adult Large)   Pulse 83   Ht 6' 1\" (1.854 m)   Wt (!) 297 lb 3.2 oz (134.8 kg)   SpO2 97%   BMI 39.21 kg/m    BMI:Body mass index is 39.21 kg/m .   GEN: NAD, obese  Psych: normal mood, normal affect     Labs/Studies:    - We reviewed the results of the overnight PSG as described on the HPI.     Patient verbalized understanding of these issues, agrees with the plan and all questions were answered today. Patient was given an opportuntity to voice any other symptoms or concerns not listed above. Patient did not have any other symptoms or concerns.        Dominic Solano DO  Board Certified in Internal Medicine and Sleep Medicine      (Note created with Dragon voice recognition and unintended spelling errors and word substitutions may occur)     "

## 2021-06-09 NOTE — PROGRESS NOTES
Assessment/Plan:    Strain of supraspinatus muscle or tendon, left, initial encounter  Pain starting with uncomfortable movement.  Ongoing. Exam with adhesive capsulitis. Unlikely to be bony abnormality.  We will start with anti-inflammatories and PT/OT.  If not improving consider xray and/or injection (or referral to ortho).      Return in about 4 weeks (around 7/24/2020) for recheck if not improving.    Kenyon Barraza MD  _______________________________    Chief Complaint   Patient presents with     Shoulder Pain     Possible left shoulder inj.  Was trying to pull out a stump and felt pain.  Has worked on a few other projects since then, but still bothering him      Subjective: Shady Rocha is a 40 y.o. year old male who I have not seen in clinic before who presents with the following acute complaint(s):    Shoulder pain:   - he tried to pull a stump from a strange angle.  Pain hurt immediately.  Duration: 3-4 weeks.  No longer improving.  He has down other projects and continued to have pain.  Palliative: kinesiotape.  Right handed.  Icy-hot initially.  No previous injury.  No weakness.    ROS: Complete review of systems obtained.  Pertinent items are listed above.     The following portions of the patient's history were reviewed and updated as appropriate: allergies, current medications, past medical history and problem list.     Objective:   /89   Pulse 60   Temp 97.5  F (36.4  C) (Oral)   Resp 10   Wt (!) 282 lb 6.4 oz (128.1 kg)   SpO2 97%   BMI 37.26 kg/m    Physical Exam  Vitals signs reviewed.   Cardiovascular:      Heart sounds: No murmur. No friction rub. No gallop.    Pulmonary:      Effort: Pulmonary effort is normal. No respiratory distress.   Musculoskeletal:      Right shoulder: He exhibits normal range of motion, no tenderness, no bony tenderness, no swelling, no effusion, no deformity and normal strength.      Left shoulder: He exhibits decreased range of motion, tenderness and  pain. He exhibits no bony tenderness, no swelling, no effusion, no crepitus, no deformity, no laceration and normal strength.      Comments: Negative peters, negative nears.  Normal Active ROM (with expresed pain).  PROM is limited above the head.   Skin:     General: Skin is warm and dry.   Neurological:      Mental Status: He is alert.       No results found for this or any previous visit (from the past 24 hour(s)).  No results found.    Additional History from Old Records Summarized (2): no  Decision to Obtain Records (1): no  Radiology Tests Summarized or Ordered (1): no  Labs Reviewed or Ordered (1): no  Medicine Test Summarized or Ordered (1): no  Independent Review of EKG or X-RAY(2 each): no    This note has been dictated using voice recognition software. Any grammatical or context distortions are unintentional and inherent to the software

## 2021-06-09 NOTE — PATIENT INSTRUCTIONS - HE
- take naproxen: 440 mg (2 pills) twice daily for 10-14 days.  If your stomach starts to hurts with this medication, reduce the dose to 1 pill.  Discontinue if discomfort continues.  The primary goal of this therapy is to reduce inflammation.  Pain relief is often experienced but is a secondary goal.

## 2021-06-09 NOTE — PROGRESS NOTES
[facility]   Shoulder Initial Evaluation    Patient Name: Shady Rocha  Date of evaluation: 7/1/2020  Referral Diagnosis: Strain of supraspinatus muscle or tendon, left, initial encounter  Referring provider: Kenyon Barraza MD  Visit Diagnosis:     ICD-10-CM    1. Strain of supraspinatus muscle or tendon, left, sequela  S46.812S    2. Adhesive capsulitis of shoulder, unspecified laterality  M75.00        Assessment:       Shady is a 40 year old male who injured his left shoulder trying to pull a stump out early June 2020. Patient denies any previous injury of limitation in this shoulder. Exam today shows limitation of left shoulder elevation in standing due to pain, and moderate strength loss and pain in left shoulder with ER and abduction, Symptoms are most consistent with rotator cuff pathology.   Pt. is a good candidate for skilled PT services to improve pain levels and function.    Goals:  Pt. will be independent with home exercise program in : 6 weeks  Patient will reach / maintain arm movement: forward;overhead;for dressing;with less difficulty;with less pain;in 6 weeks;Comment  Comment: tflexion to 150 with 0-2/10 left shoulder pain    Pt will: roll in bed and don simone shirt with 0-3/10 left shoulder pain in 6 weeks.      Patient's expectations/goals are realistic.    Barriers to Learning or Achieving Goals:  Non- adherence to the home exercise program.       Plan / Patient Instructions:        Plan of Care:   Authorization / Certification Number of Visits: Preferred One  Communication with: Referral Source  Patient Related Instruction: Nature of Condition;Treatment plan and rationale;Self Care instruction;Basis of treatment;Body mechanics;Posture;Precautions;Expected outcome  Times per Week: 2x/month  Number of Weeks: 6  Number of Visits: 2-3  Discharge Planning: Self Managment, Home exercises  Precautions / Restrictions : None known  Therapeutic Exercise:  ROM;Strengthening;Stretching  Neuromuscular Reeducation: kinesio tape;posture  Manual Therapy: soft tissue mobilization;myofascial release;strain counterstrain;joint mobilization  Functional Training (ADL's): self care;ADL's;ergonomics  Equipment: theraband      POC and pathology of condition were reviewed with patient.  Pt. is in agreement with the Plan of Care  Pt. was instructed in exercises by PT and patient was given a handout with detailed instructions.     Plan for next visit: Check AC and SC joint, progress strengthening program for Left shoulder/rotator cuff  .   Goals and plan of care were set in collaboration with patient. Plan of care is dynamic and will be modified on an ongoing basis to obtain client outcomes.    Subjective:       Social information:   Living Situation:single family home and has assistance Yes Home with yard (last year was living in a condo)   Occupation:   Work Status:Working full time   Equipment Available: None    History of Present Illness:    Shady is a 40 y.o. male who presents to therapy today with complaints of left shoulder and distal clavicle pain . Date of onset/duration of symptoms is early 2020.  Onset was sudden. Symptoms are getting better. He denies history of similar symptoms. He describes their previous level of function as not limited    Past Medical History:   Diagnosis Date     Sinus infection 2016      Uses CPAP    Past Surgical History:   Procedure Laterality Date     DENTAL SURGERY          Pain Ratin  Pain rating at best: 0  Pain rating at worst: 8  Pain description: Top of left shoulder and distal clavicle. No radiation of pain and no numbness    Functional limitations are described as occurring with:   Rolling in bed  Putting on t shirts  Reaching to high shelves  Bracing and quick movements    Patient reports has been taking Aleve for the past week (440mg every twelve hours)- slight improvement. Patient was issued 2 exercises by MD  one week ago- Codmans and wall walk.       Objective:      Note: Items left blank indicates the item was not performed or not indicated at the time of the evaluation.    Patient Outcome Measures :    Shoulder Pain and Disability Index (SPADI) in %: 29     Scores range from 0-100%, where a score of 0% represents minimal pain and maximal function. The minimal clincically important difference is a score reduction of 10%.    Shoulder Examination  1. Strain of supraspinatus muscle or tendon, left, sequela     2. Adhesive capsulitis of shoulder, unspecified laterality       Involved side: Left  Posture Observation:  Patient is tall, slightly elevated shoulders, left shoulder with slight protraction. Right shoulder slightly lower than left shoulder.     General sitting posture is  fair.  General standing posture is fair.  Cervical Clearing: Normal    Shoulder/Elbow ROM    Date:      Shoulder and Elbow ROM ( )   AROM in degrees AROM in degrees AROM in degrees    Right Left Right Left Right Left   Shoulder Flexion (0-180 ) 160 118 - 4/10       Shoulder Abduction (0-180 ) 150 85- 5/10, with scap shrug       Shoulder Extension (0-60 )         Shoulder ER (0-90 )         Shoulder IR (0-70 )         Shoulder IR/Ext T4 T12- P       Elbow Flexion (150 )         Elbow Extension (0 )          PROM in degrees PROM in degrees PROM in degrees    Right Left Right Left Right Left   Shoulder Flexion (0-180 )         Shoulder Abduction (0-180 )         Shoulder Extension (0-60 )         Shoulder ER (0-90 )         Shoulder IR (0-70 )         Elbow Flexion (150 )         Elbow Extension (0 )           Shoulder/Elbow Strength   Date:      Shoulder/Elbow Strength (/5)  Manual Muscle Test (MMT) MMT MMT MMT    Right Left Right Left Right Left   Shoulder Flexion         Supraspinatus 5 3+ pain       Shoulder Abduction         Shoulder Extension         Shoulder External Rotation 4+ 3+ pain       Shoulder Internal Rotation 5 4       Elbow Flexion          Elbow Extension         Other:         Other:           Sensation: Normal to light touch in left UE      Shoulder Special Tests   Impingement Cluster Right (+/-) Left (+/-) Rotator Cuff Tests Right (+/-) Left (+/-)   Vang-Salvador  + Drop Arm Sign     Painful Arc  + Hornblowers     Infraspinatus Test  + ERLS     AC Tests Right (+/-) Left (+/-) IRLS     Active Compression   Labral Tests Right (+/-) Left (+/-)   Crossbody Adduction   Biceps Load Test II     AC Resisted Extension   Jerk Test     GH Instability Tests Right (+/-) Left (+/-) Kaitlyn Test     Sulcus Sign   Biceps Tests Right (+/-) Left (+/-)   Apprehension   Speed     Relocation   Moeninaedson seo     Other:   Other:     Other:   Other:         Treatment Today    TREATMENT MINUTES COMMENTS   Evaluation 30 Left shoulder   Self-care/ Home management     Manual therapy 15 Instructed patient in pathology of his injury and to explain why certain positions need to be avoided for self management of this injury. Discussed positions of comfort, sleep and rest positions, and when to ice.   Neuromuscular Re-education     Therapeutic Activity     Therapeutic Exercises 15 Instructed patient in flexibility and strengthening exercises for the left shoulder and issued yellow band:  Exercises:  Exercise #1: Codmans and wall walks per MD- pt. advised to avoid left scapular hike  Comment #1: Supine shoulder flexion and tabletop flexion and abduction- 1-2 reps, 3x/day  Exercise #2: Simultaneous shoulder ER with yellow band: 2-5 seconds, 5-10 reps, 2x/day      Gait training     Modality__________________                Total 60    Blank areas are intentional and mean the treatment did not include these items.       PT Evaluation Code: (Please list factors)  Patient History/Comorbidities: See above, none  Examination: Left shoulder  Clinical Presentation: Stable  Clinical Decision Making: low    Patient History/  Comorbidities Examination  (body structures and functions,  activity limitations, and/or participation restrictions) Clinical Presentation Clinical Decision Making (Complexity)   No documented Comorbidities or personal factors 1-2 Elements Stable and/or uncomplicated Low   1-2 documented comorbidities or personal factor 3 Elements Evolving clinical presentation with changing characteristics Moderate   3-4 documented comorbidities or personal factors 4 or more Unstable and unpredictable High             Zakiya Chaudhry  7/1/2020  5:55 PM

## 2021-06-10 NOTE — PROGRESS NOTES
[facility] Discharge Summary  Patient Name: hSady Rocha  Date: 8/7/2020  Referral Diagnosis: Strain of Supraspinatus Muscle, Adhesive capsulitis  Referring provider: Dr. Jasmin Mobley  Visit Diagnosis:   1. Strain of supraspinatus muscle or tendon, left, sequela     2. Adhesive capsulitis of shoulder, unspecified laterality         Goals:  Pt. will be independent with home exercise program in : 6 weeks  Patient will reach / maintain arm movement: forward;overhead;for dressing;with less difficulty;with less pain;in 6 weeks;Comment  Comment: tflexion to 150 with 0-2/10 left shoulder pain    Pt will: roll in bed and don simone shirt with 0-3/10 left shoulder pain in 6 weeks.  Goals lost to follow up as patient never returned    Patient was seen for initial evaluation only on 7/1/20    Patient has not been in for PT in 5 weeks, and has no future PT visits scheduled.    Therapy will be discontinued at this time.  The patient will need a new referral to resume.    Thank you for your referral.  Zakiya Chaudhry  8/7/2020  8:11 AM

## 2021-06-11 NOTE — TELEPHONE ENCOUNTER
rx sent but advise pt overdue for labs (there was an order pended since January to check kidney function)

## 2021-06-11 NOTE — TELEPHONE ENCOUNTER
RN cannot approve Refill Request    RN can NOT refill this medication Protocol failed and NO refill given. Last office visit: 10/30/2018 Jasmin Mobley DO Last Physical: 9/9/2019 Last MTM visit: Visit date not found Last visit same specialty: 6/26/2020 Kenyon Barraza MD.  Next visit within 3 mo: Visit date not found  Next physical within 3 mo: Visit date not found      Katie Rhodes, [facility] Triage/Med Refill 10/1/2020    Requested Prescriptions   Pending Prescriptions Disp Refills     losartan (COZAAR) 100 MG tablet 90 tablet 2     Sig: TAKE 1 TABLET(100 MG) BY MOUTH DAILY FOR BLOOD PRESSURE       Angiotensin Receptor Blocker Protocol Failed - 9/28/2020  2:34 PM        Failed - Serum potassium within the past 12 months     No results found for: LN-POTASSIUM          Failed - Serum creatinine within the past 12 months     Creatinine   Date Value Ref Range Status   09/09/2019 1.19 0.70 - 1.30 mg/dL Final             Passed - PCP or prescribing provider visit in past 12 months       Last office visit with prescriber/PCP: 10/30/2018 Jasmin Mobley DO OR same dept: 6/26/2020 Kenyon Barraza MD OR same specialty: 6/26/2020 Kenyon Barraza MD  Last physical: 9/9/2019 Last MTM visit: Visit date not found   Next visit within 3 mo: Visit date not found  Next physical within 3 mo: Visit date not found  Prescriber OR PCP: Jasmin Mobley DO  Last diagnosis associated with med order: 1. Benign essential hypertension  - losartan (COZAAR) 100 MG tablet; TAKE 1 TABLET(100 MG) BY MOUTH DAILY FOR BLOOD PRESSURE  Dispense: 90 tablet; Refill: 2    If protocol passes may refill for 12 months if within 3 months of last provider visit (or a total of 15 months).             Passed - Blood pressure filed in past 12 months     BP Readings from Last 1 Encounters:   06/26/20 132/89

## 2021-06-12 NOTE — PROGRESS NOTES
ASSESSMENT & PLAN:  Shady was seen today for sinusitis and establish care.    Diagnoses and all orders for this visit:    Blood Pressure Isolated Elevated    GERD (gastroesophageal reflux disease)    Other orders  -     amoxicillin-clavulanate (AUGMENTIN) 875-125 mg per tablet; Take 1 tablet by mouth 2 (two) times a day for 5 days.  -     fluticasone (FLONASE) 50 mcg/actuation nasal spray; Instill 1 spray in each nostril bid      -Sinus pressure but I am not convinced that this is acute bacterial sinusitis and counseled patient on symptomatic relief as noted below.  I would recommend doing these strategies first but if symptoms are worsening I did prescribe Augmentin for him to start on twice daily for 5 days.  I am mostly concerned about his blood pressure being as elevated as it is and could be due to the 25 pounds of weight gain he has accumulated over the last several years.  Counseled him on the importance of exercise and healthy diet.  I recommended coming back again within the next several months for blood pressure recheck and fasting lab work with physical.  We also discussed that likely he has underlying sleep apnea based on his exam today and crowded oropharynx.  He thinks he does but is not ready to have sleep study evaluation.  He will let me know if he would like this referral.  Counseled him on possible side effects of untreated sleep apnea and long-term effects.  Reflux is only intermittent and he is only using Prevacid as needed.     Patient Instructions   Return to the clinic for a physical or blood pressure check with me  in the next 1-2 months.  Ideally I would like you to come in with fasting labs but if that is not possible, then just an office visit -goal is  <140/ < 90       1. Symptomatic treatment:   Sudafed 30mg 1-2 tablets every 4-6 hours as needed for sinus pressure and congestion   Or   Sudafed 12h 120mg - 1 tablet twice daily     No more than 5 days    Also add if having a lot of  drainage - I would do a non drowsy antihistamine once daily as well  -zyrtec, allegra or claritin     Also fluticasone - nasal steroid spray 1 spray in each nostril twice daily.     Let me know if you want sleep study let me know          No orders of the defined types were placed in this encounter.    There are no discontinued medications.    No Follow-up on file.     CHIEF COMPLAINT:  Chief Complaint   Patient presents with     Sinusitis     Facial pressure, ear pressure, productive cough, stuffy nose, for about a week.      Establish Care        HISTORY OF PRESENT ILLNESS:  Shady is a 37 y.o. male presenting to the clinic today to establish care and for evaluation of a possible sinus infection. He was previously a patient of Dr. Solorzano.     Sinusitis: In December 2016, he had a sinus infection for 1 month; he had never had a sinus infection before, so he waited a long time to see the doctor because he thought it would go away. He has been feeling similar symptoms for 1 week now. His son has allergies and takes medications in the morning, but he came home and was more stuffy 8-11 days ago. One week ago, he did not feel very good, and the next day, he felt worse. 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 The next day, he slept half the day; he went to take a nap and ended up sleeping for 3 hours. He has been feeling pressure in his right ear, some facial pressure on the right, and his right teeth were hurting last night. He has a cough but it is not as bad as it was during his last sinus infection. His right ear pops every time he blows his nose. Yesterday, he had rhinorrhea intermittently. He has not felt fevers or chills. He was taking a generic Sudafed for congestion yesterday and it was supposed to relieve symptoms all day, but his symptoms were back by evening. He does not have any known seasonal allergies. He feels a bit  better today than he did yesterday. [place] and he wants to get on top of this if it is a sinus infection. He started feeling better in 2-3 days after starting antibiotics for his sinus infection in December 2016. His cough was from postnasal drip last time. His wife and child have allergies, so he thinks he has allergy medication at home.     Elevated Blood Pressure: He does not check his blood pressure outside of the clinic. His blood pressure was normal in 2012 when he was seen by Dr. Solorzano, but his weight has increased about 25 pounds since then.     Sulfa Allergy: He does not know how allergic he is to sulfa; he had some issue when he was a child and his mother told him he had a sulfa allergy.     GERD: He has been taking OTC Prevacid as needed for over 10 years now.     Snoring/Tonsillar Hypertrophy: He notes he probably has sleep apnea. His wife tells him that he snores both when he breathes in and out.     Health Maintenance: He has never been a smoker. He drinks alcohol but no more than 2-3 per sitting.     REVIEW OF SYSTEMS:   He denies any personal history of hypertension, anemia, thyroid disease, depression, or anxiety. He has had teeth removed, but he has had no other surgeries. All other systems are negative.     [facility]:  His wife's parents live in [place] about 4.5 hours away. His paternal grandmother had diabetes; he denies any known family history of cancer, hypertension, or stroke. His son is 9 years old and he feels good when he is not around the house; he is wondering if he is allergic to something near the house.     History   Smoking Status     Never Smoker   Smokeless Tobacco     Not on file     Family History   Problem Relation Age of Onset     Diabetes Paternal Grandmother      Social History     Social History     Marital status:      Spouse name: N/A     Number of children: N/A     Years of education: N/A     Occupational History     Not on file.      Social History Main Topics     Smoking status: Never Smoker     Smokeless tobacco: Not on file     Alcohol use Yes      Comment: 7 drinks per week     Drug use: Not on file     Sexual activity: Yes     Partners: Female     Other Topics Concern     Not on file     Social History Narrative     Past Surgical History:   Procedure Laterality Date     DENTAL SURGERY       Allergies   Allergen Reactions     Sulfa (Sulfonamide Antibiotics)      Active Ambulatory Problems     Diagnosis Date Noted     Blood Pressure Isolated Elevated      GERD (gastroesophageal reflux disease) 08/10/2017     Resolved Ambulatory Problems     Diagnosis Date Noted     No Resolved Ambulatory Problems     Past Medical History:   Diagnosis Date     Sinus infection 12/2016        VITALS:  Vitals:    08/10/17 0935 08/10/17 0939   BP: (!) 138/108 (!) 138/108   Pulse: 70    Resp: 10    Temp: 98.2  F (36.8  C)    TempSrc: Oral    Weight: (!) 278 lb (126.1 kg)      Wt Readings from Last 3 Encounters:   08/10/17 (!) 278 lb (126.1 kg)   12/09/16 (!) 270 lb (122.5 kg)     Body mass index is 36.18 kg/(m^2).     PHYSICAL EXAM:  GENERAL:  Reveals an alert 37 y.o. male in NAD.  Vitals:  Per nursing notes.  NECK:  Supple, thyroid not palpable.  EYES: Wearing glasses. PERRLA. Extraocular movements intact. Normal conjunctiva and lids.   ENT:  Hearing grossly normal.  Normal appearance to ears and nose. Bilateral TM s, external canals, oropharynx normal. Normal lips, gums and teeth. Normal nasal mucosa, septum and turbinates. Crowded oropharynx cannot see the back of throat even with tongue depressor.  No facial tenderness.  CARDIAC:  Regular rate and rhythm without murmurs, rubs, or gallops. Legs without edema. Carotids without bruits.   LUNGS: Clear.  Respiratory effort normal.  SKIN:   Without rash, bruise, or palpable lesions.  NEURO:  Cranial nerves II-XII intact.     PSYCH:  Mood appropriate, memory intact.       QUALITY MEASURES:  The following high BMI  interventions were performed this visit: encouragement to exercise, dietary needs education, weight loss from baseline weight and lifestyle education regarding diet     DATA REVIEWED:  ADDITIONAL HISTORY SUMMARIZED (2): Reviewed Dr. Solorzano's note 3/7/2012 and Jodi Grant's note 12/9/2016.  DECISION TO OBTAIN EXTRA INFORMATION (1): None.   RADIOLOGY TESTS (1): None.  LABS (1): None.  MEDICINE TESTS (1): None.  INDEPENDENT REVIEW (2 each): None.      The visit lasted a total of 19 minutes face to face with the patient. Over 50% of the time was spent counseling and educating the patient about his health history, chronic health conditions, sinusitis, diet, exercise, and health maintenance.     IDolly, am scribing for and in the presence of Dr. Mobley.  Jasmin RENTERIA DO , personally performed the services described in this documentation, as scribed by Dolly Souza in my presence, and it is both accurate and complete.    This note has been dictated using voice recognition software. Any grammatical or context distortions are unintentional and inherent to the software.     MEDICATIONS:  Current Outpatient Prescriptions   Medication Sig Dispense Refill     lansoprazole (PREVACID) 30 MG capsule Take 30 mg by mouth daily as needed.       amoxicillin-clavulanate (AUGMENTIN) 875-125 mg per tablet Take 1 tablet by mouth 2 (two) times a day for 5 days. 10 tablet 0     fluticasone (FLONASE) 50 mcg/actuation nasal spray Instill 1 spray in each nostril bid 17 g 0     No current facility-administered medications for this visit.         Total data points: 2

## 2021-06-14 NOTE — TELEPHONE ENCOUNTER
Refill Approved    Rx renewed per Medication Renewal Policy. Medication was last renewed on 10/1/20, last OV 6/26/20.    Ramona Higuera, [facility] Triage/Med Refill 1/8/2021     Requested Prescriptions   Pending Prescriptions Disp Refills     losartan (COZAAR) 100 MG tablet [Pharmacy Med Name: LOSARTAN 100MG TABLETS] 90 tablet 0     Sig: TAKE 1 TABLET(100 MG) BY MOUTH DAILY FOR BLOOD PRESSURE       Angiotensin Receptor Blocker Protocol Passed - 1/7/2021 10:09 AM        Passed - PCP or prescribing provider visit in past 12 months       Last office visit with prescriber/PCP: 10/30/2018 Jasmin Mobely DO OR same dept: 6/26/2020 Kenyon Barraza MD OR same specialty: 6/26/2020 Kenyon Barraza MD  Last physical: 9/9/2019 Last MTM visit: Visit date not found   Next visit within 3 mo: Visit date not found  Next physical within 3 mo: Visit date not found  Prescriber OR PCP: Jasmin Mobley DO  Last diagnosis associated with med order: 1. Benign essential hypertension  - losartan (COZAAR) 100 MG tablet [Pharmacy Med Name: LOSARTAN 100MG TABLETS]; TAKE 1 TABLET(100 MG) BY MOUTH DAILY FOR BLOOD PRESSURE  Dispense: 90 tablet; Refill: 0    If protocol passes may refill for 12 months if within 3 months of last provider visit (or a total of 15 months).             Passed - Serum potassium within the past 12 months     Lab Results   Component Value Date    Potassium 4.0 10/02/2020             Passed - Blood pressure filed in past 12 months     BP Readings from Last 1 Encounters:   06/26/20 132/89             Passed - Serum creatinine within the past 12 months     Creatinine   Date Value Ref Range Status   10/02/2020 1.09 0.70 - 1.30 mg/dL Final

## 2021-06-16 PROBLEM — D35.00 ADRENAL ADENOMA: Status: ACTIVE | Noted: 2019-09-09

## 2021-06-16 PROBLEM — E66.01 MORBID OBESITY (H): Status: ACTIVE | Noted: 2019-10-01

## 2021-06-16 PROBLEM — N52.2 DRUG-INDUCED ERECTILE DYSFUNCTION: Status: ACTIVE | Noted: 2019-09-19

## 2021-06-16 PROBLEM — K21.9 GERD (GASTROESOPHAGEAL REFLUX DISEASE): Status: ACTIVE | Noted: 2017-08-10

## 2021-06-16 PROBLEM — S46.812A STRAIN OF SUPRASPINATUS MUSCLE OR TENDON, LEFT, INITIAL ENCOUNTER: Status: ACTIVE | Noted: 2020-06-26

## 2021-06-16 PROBLEM — R06.83 SNORING: Status: ACTIVE | Noted: 2017-08-10

## 2021-06-16 NOTE — TELEPHONE ENCOUNTER
Incoming call from pt following up on E-visit. He has not gotten a response and he had submitted it on Wednesday. It looks like it was assigned to Soo? Can Soo or PCP please address e-visit?

## 2021-06-17 NOTE — PROGRESS NOTES
Assessment:   The encounter diagnosis was Acute sinusitis treated with antibiotics in the past 60 days.     Plan:     Medications Ordered   Medications     amoxicillin-clavulanate (AUGMENTIN) 875-125 mg per tablet     Sig: Take 1 tablet by mouth 2 (two) times a day for 7 days.     Dispense:  14 tablet     Refill:  0     Patient Instructions     Dear Shady OLIVIA YangAlpine    After reviewing your responses, I've been able to diagnose you with?a sinus infection caused by bacteria.?     Based on your responses and diagnosis, I have prescribed augmentin to treat your symptoms. I have sent this to your pharmacy.?     It is also important to stay well hydrated, get lots of rest and take over-the-counter decongestants,?tylenol?or ibuprofen if you?are able to?take those medications per your primary care provider to help relieve discomfort.?     It is important that you take?all of?your prescribed medication even if your symptoms are improving after a few doses.? Taking?all of?your medicine helps prevent the symptoms from returning.?     If your symptoms worsen, you develop severe headache, vomiting, high fever (>102), or are not improving in 7 days, please contact your primary care provider for an appointment or visit any of our convenient Walk-in Care or Urgent Care Centers to be seen which can be found on our website?here.?     Thanks again for choosing?us?as your health care partner,?   ?  Jasmin Mobley?     Based on the information that you have provided, I have placed an order for you to start treatment.  View your full visit summary for details. Click on the link below to access your visit summary.    Your pharmacist will address any questions you may have about taking the medication.    No follow-ups on file.    Subjective:   Shady Rocha is a 41 y.o. male who submitted an eVisit request for evaluation of his Sinus Problem (Entered automatically based on patient selection in [employer].).  See the questionnaire  and message section of encounter report for information related to history of present illness and review of systems.    The following portions of the patient's history were reviewed and updated as appropriate:  He does not have any pertinent problems on file.  He is allergic to sulfa (sulfonamide antibiotics)..     Objective:   No exam performed today, patient submitted as eVisit.        Jasmin Mobley DO   Provider E-Visit time total (minutes): 15

## 2021-06-17 NOTE — PATIENT INSTRUCTIONS - HE
Dear Shady Rocha    After reviewing your responses, I've been able to diagnose you with?a sinus infection caused by bacteria.?     Based on your responses and diagnosis, I have prescribed augmentin to treat your symptoms. I have sent this to your pharmacy.?     It is also important to stay well hydrated, get lots of rest and take over-the-counter decongestants,?tylenol?or ibuprofen if you?are able to?take those medications per your primary care provider to help relieve discomfort.?     It is important that you take?all of?your prescribed medication even if your symptoms are improving after a few doses.? Taking?all of?your medicine helps prevent the symptoms from returning.?     If your symptoms worsen, you develop severe headache, vomiting, high fever (>102), or are not improving in 7 days, please contact your primary care provider for an appointment or visit any of our convenient Walk-in Care or Urgent Care Centers to be seen which can be found on our website?here.?     Thanks again for choosing?us?as your health care partner,?   ?  Jasmin Mobley?     Based on the information that you have provided, I have placed an order for you to start treatment.  View your full visit summary for details. Click on the link below to access your visit summary.    Your pharmacist will address any questions you may have about taking the medication.

## 2021-06-17 NOTE — PATIENT INSTRUCTIONS - HE
Patient Instructions by Jodi Grant FNP at 2/5/2019  3:12 PM     Author: Jodi Grant FNP Service: -- Author Type: Nurse Practitioner    Filed: 2/5/2019  3:12 PM Encounter Date: 2/5/2019 Status: Signed    : Jodi Grant FNP (Nurse Practitioner)           When to Use Antibiotics   Antibiotics are medicines used to treat infections caused by bacteria. They dont work for illnesses caused by viruses or an allergic reaction. In fact, taking antibiotics for reasons other than a bacterial infection can cause problems. For example, you may have side effects from the medicine. And if you really need an antibiotic, it may not work well.                                                                                                                                              When antibiotics wont help  Your healthcare provider wont usually prescribe antibiotics for the following conditions. You can help by not asking for them if you have:     A cold. This type of illness is caused by a virus. It can cause a runny nose, stuffed-up nose, sneezing, coughing, headache, mild body aches, and low fever. A cold gets better on its own in a few days to a week.    The flu (influenza). This is a respiratory illness caused by a virus. The flu usually goes away on its own in a week or so. It can cause fever, body aches, sore throat, and fatigue.    Bronchitis. This is an infection in the lungs most often caused by a virus. You may have coughing, phlegm, body aches, and a low fever. A common type of bronchitis is known as a chest cold (acute bronchitis). This often happens after you have a respiratory infection like a common cold. Bronchitis can take weeks to go away, but antibiotics usually dont help.    Most sore throats. Sore throats are most often caused by viruses. Your throat may feel scratchy or achy, and it may hurt to swallow. You may also have a low fever and body aches. A sore throat usually gets better in a few days.    Most  ear infections. An ear infection may be caused by a virus or bacteria. It causes pain in the ear. Antibiotics usually dont help, and the infection goes away on its own.    Most sinus infections (sinusitis). This kind of infection causes sinus pain and swelling, and a runny nose. In most cases, sinusitis goes away on its own, and antibiotics dont make recovery quicker.    Allergic rhinitis. This is a set of symptoms caused by an allergic reaction. You may have sneezing, a runny nose, itchy or watery eyes, or a sore throat. Allergies are not treated with antibiotics.    Low fever. A mild fever thats less than 100.4 F (38 C) most likely doesnt need treatment with antibiotics.   When antibiotics can help   Antibiotics can be used to treat:                                                       Strep throat. This is a throat infectioncaused by a certain type of bacteria. Symptoms of strep throat include a sore throat, white patches on the tonsils, red spots on the roof of the mouth, fever, body aches, and nausea and vomiting.    Urinary tract infection (UTI). This is a bacterial infection of the bladder and the tube that takes urine out of the body. It can cause burning pain and urine thats cloudy or tinted with blood. UTIs are very common. Antibiotics usually help treat these infections.    Some ear infections. In some cases, a healthcare provider may prescribe antibiotics for an ear infection. You may need a test to show whats causing the ear infection.    Some sinus infections. In some cases, yourhealthcare provider may give you antibiotics. He or she may first need to make sure your symptoms arent caused by a virus, fungus, allergies, or air pollutants such as smoke.   Your doctor may also recommend antibiotics if you have a condition that can affect your immune system, such as diabetes or cancer.   Self-care at home   If your infection cant be treated with antibiotics, you can take other steps to feel better. Try the  remedies below. In general:     Rest and sleep as much as needed.    Drink water and other clear fluids.    Dont smoke, and avoid smoke from other people.    Use over-the-counter medicine such as acetaminophen to ease pain or fever, as directed by your healthcare provider.   To treat sinus pain or nasal congestion:     Put a warm, moist washcloth on your face where you feel sinus pain or pressure.    Use a nasal spray with medicine or saline, as directed by your healthcare provider.    Breathe in steam from a hot shower.    Use a humidifier or cool mist vaporizer.   To quiet a cough:     Use a humidifier or cool mist vaporizer.    Breathe in steam from a hot shower.    Use cough lozenges.   To sooth a sore throat:     Suck on ice chips, popsicles, or lozenges.    Use a sore throat spray.    Use a humidifier or cool mist vaporizer.    Gargle with saltwater.    Drink warm liquids.   To ease ear pain:     Hold a warm, moist washcloth on the ear for 10 minutes at a time.  Date Last Reviewed: 9/1/2016 2000-2017 [employer]. [address] [place] [address]. All rights reserved. This information is not intended as a substitute for professional medical care. Always follow your healthcare professional's instructions.          Sinusitis (Antibiotic Treatment)    The sinuses are air-filled spaces within the bones of the face. They connect to the inside of the nose. Sinusitis is an inflammation of the tissue that lines the sinuses. Sinusitis can occur during a cold. It can also happen due to allergies to pollens and other particles in the air. Sinusitis can cause symptoms of sinus congestion and a feeling of fullness. A sinus infection causes fever, headache, and facial pain. There is often green or yellow fluid draining from the nose or into the back of the throat (post-nasal drip). You have been given antibiotics to treat this condition.  Home care    Take the full course of antibiotics as  instructed. Do not stop taking them, even when you feel better.    Drink plenty of water, hot tea, and other liquids. This may help thin nasal mucus. It also may help your sinuses drain fluids.    Heat may help soothe painful areas of your face. Use a towel soaked in hot water. Or,  the shower and direct the warm spray onto your face. Using a vaporizer along with a menthol rub at night may also help soothe symptoms.     An expectorant with guaifenesin may help thin nasal mucus and help your sinuses drain fluids.    You can use an over-the-counter decongestant, unless a similar medicine was prescribed to you. Nasal sprays work the fastest. Use one that contains phenylephrine or oxymetazoline. First blow your nose gently. Then use the spray. Do not use these medicines more often than directed on the label. If you do, your symptoms may get worse. You may also take pills that contain pseudoephedrine. Dont use products that combine multiple medicines. This is because side effects may be increased. Read labels. You can also ask the pharmacist for help. (People with high blood pressure should not use decongestants. They can raise blood pressure.)    Over-the-counter antihistamines may help if allergies contributed to your sinusitis.      Do not use nasal rinses or irrigation during an acute sinus infection, unless your healthcare provider tells you to. Rinsing may spread the infection to other areas in your sinuses.    Use acetaminophen or ibuprofen to control pain, unless another pain medicine was prescribed to you. If you have chronic liver or kidney disease or ever had a stomach ulcer, talk with your healthcare provider before using these medicines. (Aspirin should never be taken by anyone under age 18 who is ill with a fever. It may cause severe liver damage.)    Don't smoke. This can make symptoms worse.  Follow-up care  Follow up with your healthcare provider or our staff if you are better in 1 week.  When to  seek medical advice  Call your healthcare provider if any of these occur:    Facial pain or headache that gets worse    Stiff neck    Unusual drowsiness or confusion    Swelling of your forehead or eyelids    Vision problems, such as blurred or double vision    Fever of 100.4 F (38 C) or higher, or as directed by your healthcare provider    Seizure    Breathing problems    Symptoms don't go away in 10 days  Prevention  Here are steps you can take to help prevent an infection:    Keep good hand washing habits.    Dont have close contact with people who have sore throats, colds, or other upper respiratory infections.    Dont smoke, and stay away from secondhand smoke.    Stay up to date with of your vaccines.  Date Last Reviewed: 11/1/2017 2000-2017 [employer]. [address] [place] [address], [place] [address]. All rights reserved. This information is not intended as a substitute for professional medical care. Always follow your healthcare professional's instructions.

## 2021-06-18 NOTE — PATIENT INSTRUCTIONS - HE
"Patient Instructions by Dominic Solano DO at 1/29/2020 10:00 AM     Author: Dominic Solano DO Service: -- Author Type: Physician    Filed: 1/29/2020  9:56 AM Encounter Date: 1/29/2020 Status: Signed    : Dominic Solano DO (Physician)         What is sleep apnea?    Sleep apnea is a condition that makes you stop breathing for short periods while you are asleep. There are 2 types of sleep apnea. One is called \"obstructive sleep apnea,\" and the other is called \"central sleep apnea.\"  In obstructive sleep apnea, you stop breathing because your throat narrows or closes (figure 1). In central sleep apnea, you stop breathing because your brain does not send the right signals to your muscles to make you breathe. When people talk about sleep apnea, they are usually referring to obstructive sleep apnea, which is what this article is about.  People with sleep apnea do not know that they stop breathing when they are asleep. But they do sometimes wake up startled or gasping for breath. They also often hear from loved ones that they snore.  What are the symptoms of sleep apnea? -- The main symptoms of sleep apnea are loud snoring, tiredness, and daytime sleepiness. Other symptoms can include:  ?Restless sleep  ?Waking up choking or gasping  ?Morning headaches, dry mouth, or sore throat  ?Waking up often to urinate  ?Waking up feeling unrested or groggy  ?Trouble thinking clearly or remembering things  Some people with sleep apnea don't have symptoms, or they don't know they have them. They might figure that it's normal to be tired or to snore a lot.  Should I see a doctor or nurse? -- Yes. If you think you might have sleep apnea, see your doctor.  Is there a test for sleep apnea? -- Yes. If your doctor or nurse suspects you have sleep apnea, he or she might send you for a \"sleep study.\" Sleep studies can sometimes be done at home, but they are usually done in a sleep lab. For the study, you spend the night in the " "lab, and you are hooked up to different machines that monitor your heart rate, breathing, and other body functions. The results of the test will tell your doctor or nurse if you have the disorder.  Is there anything I can do on my own to help my sleep apnea? -- Yes. Here are some things that might help:  ?Stay off your back when sleeping. (This is not always practical, because people cannot control their position while asleep. Plus, it only helps some people.)  ?Lose weight, if you are overweight  ?Avoid alcohol, because it can make sleep apnea worse  How is sleep apnea treated? -- The most effective treatment for sleep apnea is a device that keeps your airway open while you sleep. Treatment with this device is called \"continuous positive airway pressure,\" or CPAP. People getting CPAP wear a face mask at night that keeps them breathing (figure 2).  If your doctor or nurse recommends a CPAP machine, try to be patient about using it. The mask might seem uncomfortable to wear at first, and the machine might seem noisy, but using the machine can really pay off. People with sleep apnea who use a CPAP machine feel more rested and generally feel better.  There is also another device that you wear in your mouth called an \"oral appliance\" or \"mandibular advancement device.\" It also helps keep your airway open while you sleep.  In rare cases, when nothing else helps, doctors recommend surgery to keep the airway open. Surgery to do this is not always effective, and even when it is, the problem can come back.  Is sleep apnea dangerous? -- It can be. People with sleep apnea do not get good-quality sleep, so they are often tired and not alert. This puts them at risk for car accidents and other types of accidents. Plus, studies show that people with sleep apnea are more likely than others to have high blood pressure, heart attacks, and other serious heart problems. In people with severe sleep apnea, getting treated (for example, " with a CPAP machine) can help prevent some of these problems.    GRAPHICS  Airway in a person with sleep apnea    Normally when a person sleeps, the airway remains open, and air can pass from the nose and mouth to the lungs. In a person with sleep apnea, parts of the throat and mouth drop into the airway and block off the flow of air. This can cause loud snoring and interrupt breathing for short periods.  Graphic 76962 Version 5.0    Continuous positive airway pressure (CPAP) for sleep apnea    The CPAP mask gently blows air into your nose while you sleep. It puts just enough pressure on your airway to keep it from closing. The mask in this picture fits over just the nose. Other CPAP devices have masks that fit over the nose and mouth.
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
## 2021-06-21 NOTE — PROGRESS NOTES
I met with Shady Rocha at the request of Dr. Mobley to recheck his blood pressure.  Blood pressure medications on the MAR were reviewed with patient.    Patient has taken all medications as per usual regimen: Yes  Patient reports tolerating them without any issues or concerns: Yes    Vitals:    11/13/18 1026   BP: (!) 132/91   Patient Site: Right Arm   Patient Position: Sitting   Cuff Size: Adult Large       After 5 minutes, the patient's blood pressure remained greater than or equal to 140/90.    Is the patient currently having any chest pain?  No  Does the patient currently have a headache?   No  Does the patient currently have any vision changes? No  Does the patient currently have any nausea? No  Does the patient currently have any abdominal pain? No    The previous encounter was reviewed.  The patient was discharged and the note will be sent to the provider for final review.

## 2021-06-21 NOTE — PROGRESS NOTES
I met with Shady Rocha at the request of Dr. Mobley to recheck his blood pressure.  Blood pressure medications on the MAR were reviewed with patient.    Patient has taken all medications as per usual regimen: Yes  Patient reports tolerating them without any issues or concerns: Yes    Vitals:    11/21/18 0931   BP: 130/85   Patient Site: Right Arm   Patient Position: Sitting   Cuff Size: Adult Large   Pulse: 66       Blood pressure was taken, previous encounter was reviewed, recorded blood pressure below 140/90.  Patient was discharged and the note will be sent to the provider for final review.     Patient is requesting a refill on Lisinpril as he is now taking 20mg.  Medication refill T-d up.

## 2021-06-21 NOTE — PROGRESS NOTES
Blood pressure improved but still higher than ideal. Does he have enough meds to go up to 20mg daily and come back for recheck again in a week or so. He wont have to repeat labs as long as labs come back normal today . If blood pressure better controlled on the recheck we will call in the 20mg tablets.

## 2021-06-21 NOTE — PROGRESS NOTES
Reason contacted:  BP Check  Information relayed:  Informed patient of Dr. Mobley's message above. Pt voiced understanding. Pt scheduled 11/21/18 for nurse BP check.  Additional questions:  No  Further follow-up needed:  Yes

## 2021-06-21 NOTE — PROGRESS NOTES
I met with Shady Rocha at the request of Jasmin Mobley DO  to recheck his blood pressure.  Blood pressure medications on the MAR were reviewed with patient.    Patient has taken all medications as per usual regimen: N/A  Patient reports tolerating them without any issues or concerns: N/A    Vitals:    10/17/18 0939 10/17/18 0959   BP: (!) 156/106 (!) 148/105   Patient Site: Left Arm Left Arm   Patient Position: Sitting Sitting   Cuff Size: Adult Large Adult Large   Pulse: 82 91       After 5 minutes, the patient's blood pressure remained greater than or equal to 140/90.    Is the patient currently having any chest pain?  No  Does the patient currently have a headache?   No  Does the patient currently have any vision changes? No  Does the patient currently have any nausea? No  Does the patient currently have any abdominal pain? No    The previous encounter was reviewed.  The patient was discharged and the note will be sent to the provider for final review.
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
## 2021-06-24 NOTE — PROGRESS NOTES
I met with Shady Rocha at the request of Dr. Mobley to recheck his blood pressure.  Blood pressure medications on the MAR were reviewed with patient.    Patient has taken all medications as per usual regimen: Yes  Patient reports tolerating them without any issues or concerns: Yes    Vitals:    02/13/19 0939 02/13/19 0945   BP: (!) 147/97 125/89   Patient Site: Left Arm Left Arm   Patient Position: Sitting Sitting   Cuff Size: Adult Large Adult Large   Pulse:  88       After 5 minutes, the patient's blood pressure was < 140/90, the previous encounter was reviewed, recorded blood pressure below 140/90.  Patient was discharged and the note will be sent to the provider for final review.
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
## 2021-06-27 NOTE — PROGRESS NOTES
Progress Notes by Jodi Grant FNP at 2/5/2019  3:12 PM     Author: Jodi Grant FNP Service: -- Author Type: Nurse Practitioner    Filed: 2/5/2019  3:12 PM Encounter Date: 2/5/2019 Status: Signed    : Jodi Grant FNP (Nurse Practitioner)         Assessment:   The encounter diagnosis was Acute sinusitis with symptoms greater than 10 days.     Plan:     Medications Ordered   Medications   ? amoxicillin-clavulanate (AUGMENTIN) 875-125 mg per tablet     Sig: Take 1 tablet by mouth 2 (two) times a day for 10 days.     Dispense:  20 tablet     Refill:  0     Patient Instructions       When to Use Antibiotics   Antibiotics are medicines used to treat infections caused by bacteria. They dont work for illnesses caused by viruses or an allergic reaction. In fact, taking antibiotics for reasons other than a bacterial infection can cause problems. For example, you may have side effects from the medicine. And if you really need an antibiotic, it may not work well.                                                                                                                                              When antibiotics wont help  Your healthcare provider wont usually prescribe antibiotics for the following conditions. You can help by not asking for them if you have:     A cold. This type of illness is caused by a virus. It can cause a runny nose, stuffed-up nose, sneezing, coughing, headache, mild body aches, and low fever. A cold gets better on its own in a few days to a week.    The flu (influenza). This is a respiratory illness caused by a virus. The flu usually goes away on its own in a week or so. It can cause fever, body aches, sore throat, and fatigue.    Bronchitis. This is an infection in the lungs most often caused by a virus. You may have coughing, phlegm, body aches, and a low fever. A common type of bronchitis is known as a chest cold (acute bronchitis). This often happens after you have a respiratory infection  like a common cold. Bronchitis can take weeks to go away, but antibiotics usually dont help.    Most sore throats. Sore throats are most often caused by viruses. Your throat may feel scratchy or achy, and it may hurt to swallow. You may also have a low fever and body aches. A sore throat usually gets better in a few days.    Most ear infections. An ear infection may be caused by a virus or bacteria. It causes pain in the ear. Antibiotics usually dont help, and the infection goes away on its own.    Most sinus infections (sinusitis). This kind of infection causes sinus pain and swelling, and a runny nose. In most cases, sinusitis goes away on its own, and antibiotics dont make recovery quicker.    Allergic rhinitis. This is a set of symptoms caused by an allergic reaction. You may have sneezing, a runny nose, itchy or watery eyes, or a sore throat. Allergies are not treated with antibiotics.    Low fever. A mild fever thats less than 100.4 F (38 C) most likely doesnt need treatment with antibiotics.   When antibiotics can help   Antibiotics can be used to treat:                                                       Strep throat. This is a throat infectioncaused by a certain type of bacteria. Symptoms of strep throat include a sore throat, white patches on the tonsils, red spots on the roof of the mouth, fever, body aches, and nausea and vomiting.    Urinary tract infection (UTI). This is a bacterial infection of the bladder and the tube that takes urine out of the body. It can cause burning pain and urine thats cloudy or tinted with blood. UTIs are very common. Antibiotics usually help treat these infections.    Some ear infections. In some cases, a healthcare provider may prescribe antibiotics for an ear infection. You may need a test to show whats causing the ear infection.    Some sinus infections. In some cases, yourhealthcare provider may give you antibiotics. He or she may first need to make sure your symptoms  arent caused by a virus, fungus, allergies, or air pollutants such as smoke.   Your doctor may also recommend antibiotics if you have a condition that can affect your immune system, such as diabetes or cancer.   Self-care at home   If your infection cant be treated with antibiotics, you can take other steps to feel better. Try the remedies below. In general:     Rest and sleep as much as needed.    Drink water and other clear fluids.    Dont smoke, and avoid smoke from other people.    Use over-the-counter medicine such as acetaminophen to ease pain or fever, as directed by your healthcare provider.   To treat sinus pain or nasal congestion:     Put a warm, moist washcloth on your face where you feel sinus pain or pressure.    Use a nasal spray with medicine or saline, as directed by your healthcare provider.    Breathe in steam from a hot shower.    Use a humidifier or cool mist vaporizer.   To quiet a cough:     Use a humidifier or cool mist vaporizer.    Breathe in steam from a hot shower.    Use cough lozenges.   To sooth a sore throat:     Suck on ice chips, popsicles, or lozenges.    Use a sore throat spray.    Use a humidifier or cool mist vaporizer.    Gargle with saltwater.    Drink warm liquids.   To ease ear pain:     Hold a warm, moist washcloth on the ear for 10 minutes at a time.  Date Last Reviewed: 9/1/2016 2000-2017 [employer]. [address] [place] [address]. All rights reserved. This information is not intended as a substitute for professional medical care. Always follow your healthcare professional's instructions.          Sinusitis (Antibiotic Treatment)    The sinuses are air-filled spaces within the bones of the face. They connect to the inside of the nose. Sinusitis is an inflammation of the tissue that lines the sinuses. Sinusitis can occur during a cold. It can also happen due to allergies to pollens and other particles in the air. Sinusitis can cause symptoms of  sinus congestion and a feeling of fullness. A sinus infection causes fever, headache, and facial pain. There is often green or yellow fluid draining from the nose or into the back of the throat (post-nasal drip). You have been given antibiotics to treat this condition.  Home care    Take the full course of antibiotics as instructed. Do not stop taking them, even when you feel better.    Drink plenty of water, hot tea, and other liquids. This may help thin nasal mucus. It also may help your sinuses drain fluids.    Heat may help soothe painful areas of your face. Use a towel soaked in hot water. Or,  the shower and direct the warm spray onto your face. Using a vaporizer along with a menthol rub at night may also help soothe symptoms.     An expectorant with guaifenesin may help thin nasal mucus and help your sinuses drain fluids.    You can use an over-the-counter decongestant, unless a similar medicine was prescribed to you. Nasal sprays work the fastest. Use one that contains phenylephrine or oxymetazoline. First blow your nose gently. Then use the spray. Do not use these medicines more often than directed on the label. If you do, your symptoms may get worse. You may also take pills that contain pseudoephedrine. Dont use products that combine multiple medicines. This is because side effects may be increased. Read labels. You can also ask the pharmacist for help. (People with high blood pressure should not use decongestants. They can raise blood pressure.)    Over-the-counter antihistamines may help if allergies contributed to your sinusitis.      Do not use nasal rinses or irrigation during an acute sinus infection, unless your healthcare provider tells you to. Rinsing may spread the infection to other areas in your sinuses.    Use acetaminophen or ibuprofen to control pain, unless another pain medicine was prescribed to you. If you have chronic liver or kidney disease or ever had a stomach ulcer, talk with  your healthcare provider before using these medicines. (Aspirin should never be taken by anyone under age 18 who is ill with a fever. It may cause severe liver damage.)    Don't smoke. This can make symptoms worse.  Follow-up care  Follow up with your healthcare provider or our staff if you are better in 1 week.  When to seek medical advice  Call your healthcare provider if any of these occur:    Facial pain or headache that gets worse    Stiff neck    Unusual drowsiness or confusion    Swelling of your forehead or eyelids    Vision problems, such as blurred or double vision    Fever of 100.4 F (38 C) or higher, or as directed by your healthcare provider    Seizure    Breathing problems    Symptoms don't go away in 10 days  Prevention  Here are steps you can take to help prevent an infection:    Keep good hand washing habits.    Dont have close contact with people who have sore throats, colds, or other upper respiratory infections.    Dont smoke, and stay away from secondhand smoke.    Stay up to date with of your vaccines.  Date Last Reviewed: 11/1/2017 2000-2017 [employer]. [address] [place] [address]. All rights reserved. This information is not intended as a substitute for professional medical care. Always follow your healthcare professional's instructions.          Return for further follow up if needed. Call [phone]) or schedule an appointment via [employer]..    Subjective:   Shady Rocha is a 38 y.o. male who submitted an eVisit request for evaluation of his Sinus Problem.  See the questionnaire and message section of encounter report for information related to history of present illness and review of systems.    The following portions of the patient's history were reviewed and updated as appropriate:  He does not have any pertinent problems on file.  He is allergic to sulfa (sulfonamide antibiotics)..     Objective:   No exam performed today, patient submitted as  Vladislavit.

## 2021-07-09 ENCOUNTER — COMMUNICATION - HEALTHEAST (OUTPATIENT)
Dept: FAMILY MEDICINE | Facility: CLINIC | Age: 41
End: 2021-07-09

## 2021-07-09 DIAGNOSIS — I10 BENIGN ESSENTIAL HYPERTENSION: ICD-10-CM

## 2021-07-09 RX ORDER — LOSARTAN POTASSIUM 100 MG/1
TABLET ORAL
Qty: 90 TABLET | Refills: 1 | Status: SHIPPED | OUTPATIENT
Start: 2021-07-09 | End: 2022-01-05

## 2021-07-09 NOTE — TELEPHONE ENCOUNTER
Telephone Encounter by Amber Oglesby RN at 7/9/2021  7:09 AM     Author: Amber Oglesby RN Service: -- Author Type: Registered Nurse    Filed: 7/9/2021  7:10 AM Encounter Date: 7/8/2021 Status: Signed    : Amber Oglesby RN (Registered Nurse)       RN cannot approve Refill Request    RN can NOT refill this medication PCP messaged that patient is overdue for Labs. Last office visit: 10/30/2018 Jasmin Mobley DO Last Physical: 9/9/2019 Last MTM visit: Visit date not found Last visit same specialty: 6/26/2020 Kenyon Barraza MD.  Next visit within 3 mo: Visit date not found  Next physical within 3 mo: Visit date not found      [facility] Triage/Med Refill 7/9/2021    Requested Prescriptions   Pending Prescriptions Disp Refills   ? losartan (COZAAR) 100 MG tablet [Pharmacy Med Name: LOSARTAN 100MG TABLETS] 90 tablet 1     Sig: TAKE 1 TABLET(100 MG) BY MOUTH DAILY FOR BLOOD PRESSURE       Angiotensin Receptor Blocker Protocol Failed - 7/8/2021 10:31 PM        Failed - Blood pressure filed in past 12 months     BP Readings from Last 1 Encounters:   06/26/20 132/89             Passed - PCP or prescribing provider visit in past 12 months       Last office visit with prescriber/PCP: 10/30/2018 Jasmin Mobley DO OR same dept: Visit date not found OR same specialty: 6/26/2020 Kenyon Barraza MD  Last physical: 9/9/2019 Last MTM visit: Visit date not found   Next visit within 3 mo: Visit date not found  Next physical within 3 mo: Visit date not found  Prescriber OR PCP: Jasmin Mobley DO  Last diagnosis associated with med order: 1. Benign essential hypertension  - losartan (COZAAR) 100 MG tablet [Pharmacy Med Name: LOSARTAN 100MG TABLETS]; TAKE 1 TABLET(100 MG) BY MOUTH DAILY FOR BLOOD PRESSURE  Dispense: 90 tablet; Refill: 1    If protocol passes may refill for 12 months if within 3 months of last provider visit (or a total of 15 months).             Passed -  Serum potassium within the past 12 months     Lab Results   Component Value Date    Potassium 4.0 10/02/2020             Passed - Serum creatinine within the past 12 months     Creatinine   Date Value Ref Range Status   10/02/2020 1.09 0.70 - 1.30 mg/dL Final

## 2021-08-14 ENCOUNTER — HEALTH MAINTENANCE LETTER (OUTPATIENT)
Age: 41
End: 2021-08-14

## 2021-10-09 ENCOUNTER — HEALTH MAINTENANCE LETTER (OUTPATIENT)
Age: 41
End: 2021-10-09

## 2021-10-25 ENCOUNTER — OFFICE VISIT (OUTPATIENT)
Dept: FAMILY MEDICINE | Facility: CLINIC | Age: 41
End: 2021-10-25
Payer: COMMERCIAL

## 2021-10-25 ENCOUNTER — TRANSFERRED RECORDS (OUTPATIENT)
Dept: HEALTH INFORMATION MANAGEMENT | Facility: CLINIC | Age: 41
End: 2021-10-25

## 2021-10-25 VITALS
HEART RATE: 57 BPM | WEIGHT: 285 LBS | SYSTOLIC BLOOD PRESSURE: 126 MMHG | TEMPERATURE: 97.8 F | BODY MASS INDEX: 37.77 KG/M2 | DIASTOLIC BLOOD PRESSURE: 88 MMHG | HEIGHT: 73 IN

## 2021-10-25 DIAGNOSIS — E66.01 MORBID OBESITY (H): ICD-10-CM

## 2021-10-25 DIAGNOSIS — Z00.00 ROUTINE GENERAL MEDICAL EXAMINATION AT A HEALTH CARE FACILITY: Primary | ICD-10-CM

## 2021-10-25 DIAGNOSIS — N52.2 DRUG-INDUCED ERECTILE DYSFUNCTION: ICD-10-CM

## 2021-10-25 DIAGNOSIS — I10 PRIMARY HYPERTENSION: ICD-10-CM

## 2021-10-25 DIAGNOSIS — E78.2 MIXED HYPERLIPIDEMIA: ICD-10-CM

## 2021-10-25 PROBLEM — G47.33 OSA (OBSTRUCTIVE SLEEP APNEA): Status: ACTIVE | Noted: 2021-10-25

## 2021-10-25 PROBLEM — S46.812A STRAIN OF SUPRASPINATUS MUSCLE OR TENDON, LEFT, INITIAL ENCOUNTER: Status: RESOLVED | Noted: 2020-06-26 | Resolved: 2021-10-25

## 2021-10-25 PROBLEM — R06.83 SNORING: Status: RESOLVED | Noted: 2017-08-10 | Resolved: 2021-10-25

## 2021-10-25 LAB
ALBUMIN SERPL-MCNC: 4.3 G/DL (ref 3.5–5)
ALP SERPL-CCNC: 52 U/L (ref 45–120)
ALT SERPL W P-5'-P-CCNC: 34 U/L (ref 0–45)
ANION GAP SERPL CALCULATED.3IONS-SCNC: 11 MMOL/L (ref 5–18)
AST SERPL W P-5'-P-CCNC: 28 U/L (ref 0–40)
BILIRUB SERPL-MCNC: 0.7 MG/DL (ref 0–1)
BUN SERPL-MCNC: 13 MG/DL (ref 8–22)
CALCIUM SERPL-MCNC: 9.6 MG/DL (ref 8.5–10.5)
CHLORIDE BLD-SCNC: 104 MMOL/L (ref 98–107)
CHOLEST SERPL-MCNC: 205 MG/DL
CO2 SERPL-SCNC: 22 MMOL/L (ref 22–31)
CREAT SERPL-MCNC: 1.12 MG/DL (ref 0.7–1.3)
ERYTHROCYTE [DISTWIDTH] IN BLOOD BY AUTOMATED COUNT: 12.5 % (ref 10–15)
FASTING STATUS PATIENT QL REPORTED: YES
GFR SERPL CREATININE-BSD FRML MDRD: 81 ML/MIN/1.73M2
GLUCOSE BLD-MCNC: 86 MG/DL (ref 70–125)
HCT VFR BLD AUTO: 42.7 % (ref 40–53)
HDLC SERPL-MCNC: 40 MG/DL
HGB BLD-MCNC: 14.2 G/DL (ref 13.3–17.7)
LDLC SERPL CALC-MCNC: 146 MG/DL
MCH RBC QN AUTO: 28.7 PG (ref 26.5–33)
MCHC RBC AUTO-ENTMCNC: 33.3 G/DL (ref 31.5–36.5)
MCV RBC AUTO: 86 FL (ref 78–100)
PLATELET # BLD AUTO: 265 10E3/UL (ref 150–450)
POTASSIUM BLD-SCNC: 4 MMOL/L (ref 3.5–5)
PROT SERPL-MCNC: 7.1 G/DL (ref 6–8)
RBC # BLD AUTO: 4.94 10E6/UL (ref 4.4–5.9)
SODIUM SERPL-SCNC: 137 MMOL/L (ref 136–145)
TRIGL SERPL-MCNC: 95 MG/DL
WBC # BLD AUTO: 5.3 10E3/UL (ref 4–11)

## 2021-10-25 PROCEDURE — 36415 COLL VENOUS BLD VENIPUNCTURE: CPT | Performed by: FAMILY MEDICINE

## 2021-10-25 PROCEDURE — 85027 COMPLETE CBC AUTOMATED: CPT | Performed by: FAMILY MEDICINE

## 2021-10-25 PROCEDURE — 99396 PREV VISIT EST AGE 40-64: CPT | Mod: 25 | Performed by: FAMILY MEDICINE

## 2021-10-25 PROCEDURE — 99214 OFFICE O/P EST MOD 30 MIN: CPT | Mod: 25 | Performed by: FAMILY MEDICINE

## 2021-10-25 PROCEDURE — 90471 IMMUNIZATION ADMIN: CPT | Performed by: FAMILY MEDICINE

## 2021-10-25 PROCEDURE — 80053 COMPREHEN METABOLIC PANEL: CPT | Performed by: FAMILY MEDICINE

## 2021-10-25 PROCEDURE — 80061 LIPID PANEL: CPT | Performed by: FAMILY MEDICINE

## 2021-10-25 PROCEDURE — 90686 IIV4 VACC NO PRSV 0.5 ML IM: CPT | Performed by: FAMILY MEDICINE

## 2021-10-25 RX ORDER — TADALAFIL 10 MG/1
10 TABLET ORAL DAILY PRN
COMMUNITY
End: 2024-03-21

## 2021-10-25 ASSESSMENT — MIFFLIN-ST. JEOR: SCORE: 2246.63

## 2021-10-25 NOTE — PROGRESS NOTES
SUBJECTIVE:   CC: Shady Rocha is an 41 year old male who presents for preventative health visit.     Patient has been advised of split billing requirements and indicates understanding: Yes  Healthy Habits:     Getting at least 3 servings of Calcium per day:  Yes    Bi-annual eye exam:  NO    Dental care twice a year:  Yes    Sleep apnea or symptoms of sleep apnea:  Sleep apnea    Diet:  Regular (no restrictions)    Frequency of exercise:  4-5 days/week    Duration of exercise:  45-60 minutes    Taking medications regularly:  Yes    Medication side effects:  Other    PHQ-2 Total Score: 0    Additional concerns today:  No    Obesity :  2-3 mile loop of walking 4-5 times per week. Started Noom 2 months ago. Down 10-15 lbs. daily would like to lose more weight and decrease his losartan use. This medication has caused some erectile dysfunction issues. They have trialed multiple different medications and Cialis seems to work best with the least amount of side effects. He will occasionally still get a stuffy nose with this.    Today's PHQ-2 Score:   PHQ-2 ( 1999 [employer]) 10/21/2021   Q1: Little interest or pleasure in doing things 0   Q2: Feeling down, depressed or hopeless 0   PHQ-2 Score 0   Q1: Little interest or pleasure in doing things Not at all   Q2: Feeling down, depressed or hopeless Not at all   PHQ-2 Score 0       Abuse: Current or Past(Physical, Sexual or Emotional)- No  Do you feel safe in your environment? Yes    Have you ever done Advance Care Planning? (For example, a Health Directive, POLST, or a discussion with a medical provider or your loved ones about your wishes): Yes, patient states has an Advance Care Planning document and will bring a copy to the clinic.    Social History     Tobacco Use     Smoking status: Never Smoker     Smokeless tobacco: Never Used   Substance Use Topics     Alcohol use: Yes     Comment: Alcoholic Drinks/day: 7 drinks per week     If you drink alcohol do you typically  "have >3 drinks per day or >7 drinks per week? No    Alcohol Use 10/25/2021   Prescreen: >3 drinks/day or >7 drinks/week? -   Prescreen: >3 drinks/day or >7 drinks/week? No       Last PSA:   Prostate Specific Antigen Screen   Date Value Ref Range Status   11/13/2018 0.4 0.0 - 2.5 ng/mL Final       Reviewed orders with patient. Reviewed health maintenance and updated orders accordingly - Yes  Lab work is in process    Reviewed and updated as needed this visit by clinical staff  Tobacco  Allergies  Meds  Problems  Med Hx  Surg Hx  Fam Hx          Reviewed and updated as needed this visit by Provider  Tobacco  Allergies  Meds  Problems  Med Hx  Surg Hx  Fam Hx         Past Medical History:   Diagnosis Date     Gastroesophageal reflux disease without esophagitis         OBJECTIVE:   /88   Pulse 57   Temp 97.8  F (36.6  C) (Oral)   Ht 1.854 m (6' 1\")   Wt 129.3 kg (285 lb)   BMI 37.60 kg/m      Physical Exam  GENERAL: healthy, alert and no distress  EYES: Eyes grossly normal to inspection, PERRL and conjunctivae and sclerae normal  HENT: ear canals and TM's normal, nose and mouth without ulcers or lesions  NECK: no adenopathy, no asymmetry, masses, or scars and thyroid normal to palpation  RESP: lungs clear to auscultation - no rales, rhonchi or wheezes  CV: regular rate and rhythm, normal S1 S2, no S3 or S4, no murmur, click or rub, no peripheral edema and peripheral pulses strong  ABDOMEN: soft, nontender, no hepatosplenomegaly, no masses and bowel sounds normal  MS: no gross musculoskeletal defects noted, no edema  SKIN: no suspicious lesions or rashes  NEURO: Normal strength and tone, mentation intact and speech normal  PSYCH: mentation appears normal, affect normal/bright    Diagnostic Test Results:  Labs reviewed in Epic    ASSESSMENT/PLAN:   Shady was seen today for physical.    Diagnoses and all orders for this visit:    Routine general medical examination at a [facility]" facility  Reviewed and updated patient's past medical, surgical, family, social history, along with current medications and allergies.  Updated patient's health maintenance as further outlined below.  COUNSELING:   Reviewed preventive health counseling, as reflected in patient instructions  Special attention given to:        Regular exercise       Healthy diet/nutrition       Vision screening       Alcohol Use        Consider Hep C screening for all patients one time for ages 18-79 years       HIV screeninx in teen years, 1x in adult years, and at intervals if high risk       Advance Care Planning  -     INFLUENZA VACCINE IM >6 MO VALENT IIV4 (ALFURIA/FLUZONE)  -     CBC with platelets; Future  -     REVIEW OF HEALTH MAINTENANCE PROTOCOL ORDERS    Obesity (BMI 35.0-39.9) with comorbidity (H)  Body mass index is 37.6 kg/m . He has been increasing his activity and participating in Noom with appropriate results. Reiterated the importance of healthy diet and exercise to aid in weight loss.    Primary hypertension  Blood pressure well controlled on losartan 100 mg daily.    Mixed hyperlipidemia  Not currently on pharmacotherapy. Recheck labs and determine ASCVD risk today. He is fasting.  -     Comprehensive metabolic panel (BMP + Alb, Alk Phos, ALT, AST, Total. Bili, TP); Future  -     Lipid Profile (Chol, Trig, HDL, LDL calc); Future    Drug-induced erectile dysfunction  Secondary to losartan, but currently doing well on Cialis as needed. Patient would ideally like to lose weight and come off the losartan in the future so he may not require Cialis.      Patient has been advised of split billing requirements and indicates understanding: Yes      Ingrid Blackwood DO  [facility]

## 2021-11-04 ENCOUNTER — E-VISIT (OUTPATIENT)
Dept: FAMILY MEDICINE | Facility: CLINIC | Age: 41
End: 2021-11-04
Payer: COMMERCIAL

## 2021-11-04 DIAGNOSIS — J01.00 ACUTE NON-RECURRENT MAXILLARY SINUSITIS: Primary | ICD-10-CM

## 2021-11-04 PROCEDURE — 99421 OL DIG E/M SVC 5-10 MIN: CPT | Performed by: FAMILY MEDICINE

## 2021-11-04 NOTE — PATIENT INSTRUCTIONS
Sinusitis (Antibiotic Treatment)    The sinuses are air-filled spaces within the bones of the face. They connect to the inside of the nose. Sinusitis is an inflammation of the tissue that lines the sinuses. Sinusitis can occur during a cold. It can also happen due to allergies to pollens and other particles in the air. Sinusitis can cause symptoms of sinus congestion and a feeling of fullness. A sinus infection causes fever, headache, and facial pain. There is often green or yellow fluid draining from the nose or into the back of the throat (post-nasal drip). You have been given antibiotics to treat this condition.   Home care    Take the full course of antibiotics as instructed. Don't stop taking them, even when you feel better.    Drink plenty of water, hot tea, and other liquids as directed by the healthcare provider. This may help thin nasal mucus. It also may help your sinuses drain fluids.    Heat may help soothe painful areas of your face. Use a towel soaked in hot water. Or,  the shower and direct the warm spray onto your face. Using a vaporizer along with a menthol rub at night may also help soothe symptoms.     An expectorant with guaifenesin may help thin nasal mucus and help your sinuses drain fluids. Talk with your provider or pharmacists before taking an over-the-counter (OTC) medicine if you have any questions about it or its side effects..    You can use an OTC decongestant, unless a similar medicine was prescribed to you. Nasal sprays work the fastest. Use one that contains phenylephrine or oxymetazoline. First blow your nose gently. Then use the spray. Don't use these medicines more often than directed on the label. If you do, your symptoms may get worse. You may also take pills that contain pseudoephedrine. Don t use products that combine multiple medicines. This is because side effects may be increased. Read labels. You can also ask the pharmacist for help. (People with high blood  pressure should not use decongestants. They can raise blood pressure.) Talk with your provider or pharmacist if you have any questions about the medicine..    OTC antihistamines may help if allergies contributed to your sinusitis. Talk with your provider or pharmacist if you have any questions about the medicine..    Don't use nasal rinses or irrigation during an acute sinus infection, unless your healthcare provider tells you to. Rinsing may spread the infection to other areas in your sinuses.    Use acetaminophen or ibuprofen to control pain, unless another pain medicine was prescribed to you. If you have chronic liver or kidney disease or ever had a stomach ulcer, talk with your healthcare provider before using these medicines. Never give aspirin to anyone under age 18 who is ill with a fever. It may cause severe liver damage.    Don't smoke. This can make symptoms worse.    Follow-up care  Follow up with your healthcare provider, or as advised.   When to seek medical advice  Call your healthcare provider if any of these occur:     Facial pain or headache that gets worse    Stiff neck    Unusual drowsiness or confusion    Swelling of your forehead or eyelids    Symptoms don't go away in 10 days    Vision problems, such as blurred or double vision    Fever of 100.4 F (38 C) or higher, or as directed by your healthcare provider  Call 911  Call 911 if any of these occur:     Seizure    Trouble breathing    Feeling dizzy or faint    Fingernails, skin or lips look blue, purple , or gray  Prevention  Here are steps you can take to help prevent an infection:     Keep good hand washing habits.    Don t have close contact with people who have sore throats, colds, or other upper respiratory infections.    Don t smoke, and stay away from secondhand smoke.    Stay up to date with of your vaccines.  [employer] last reviewed this educational content on 12/1/2019 2000-2021 [employer]. All rights reserved. This  information is not intended as a substitute for professional medical care. Always follow your healthcare professional's instructions.        Dear Shady Rocha    After reviewing your responses, I've been able to diagnose you with?a sinus infection.  These are typically viral but if symptoms persist for more than 7 to 10 days we can treat with an antibiotic.    Based on your responses and diagnosis, I have prescribed augmentin to treat your symptoms. I have sent this to your pharmacy.?     It is also important to stay well hydrated, get lots of rest and take over-the-counter decongestants,?tylenol?or ibuprofen if you?are able to?take those medications per your primary care provider to help relieve discomfort.?     It is important that you take?all of?your prescribed medication even if your symptoms are improving after a few doses.? Taking?all of?your medicine helps prevent the symptoms from returning.?     If your symptoms worsen, you develop severe headache, vomiting, high fever (>102), or are not improving in 7 days, please contact your primary care provider for an appointment or visit any of our convenient Walk-in Care or Urgent Care Centers to be seen which can be found on our website?here.?     Thanks again for choosing?us?as your health care partner,?   ?  Ingrid Blackwood, DO?

## 2021-12-03 DIAGNOSIS — G47.33 OSA (OBSTRUCTIVE SLEEP APNEA): ICD-10-CM

## 2021-12-03 DIAGNOSIS — G47.33 OBSTRUCTIVE SLEEP APNEA (ADULT) (PEDIATRIC): Primary | ICD-10-CM

## 2022-01-04 ENCOUNTER — VIRTUAL VISIT (OUTPATIENT)
Dept: SLEEP MEDICINE | Facility: CLINIC | Age: 42
End: 2022-01-04
Payer: COMMERCIAL

## 2022-01-04 VITALS — HEIGHT: 73 IN | BODY MASS INDEX: 36.31 KG/M2 | WEIGHT: 274 LBS

## 2022-01-04 DIAGNOSIS — G47.33 OBSTRUCTIVE SLEEP APNEA: Primary | ICD-10-CM

## 2022-01-04 PROCEDURE — 99213 OFFICE O/P EST LOW 20 MIN: CPT | Mod: 95 | Performed by: INTERNAL MEDICINE

## 2022-01-04 ASSESSMENT — SLEEP AND FATIGUE QUESTIONNAIRES
HOW LIKELY ARE YOU TO NOD OFF OR FALL ASLEEP WHILE LYING DOWN TO REST IN THE AFTERNOON WHEN CIRCUMSTANCES PERMIT: SLIGHT CHANCE OF DOZING
HOW LIKELY ARE YOU TO NOD OFF OR FALL ASLEEP WHILE SITTING AND READING: WOULD NEVER DOZE
HOW LIKELY ARE YOU TO NOD OFF OR FALL ASLEEP IN A CAR, WHILE STOPPED FOR A FEW MINUTES IN TRAFFIC: WOULD NEVER DOZE
HOW LIKELY ARE YOU TO NOD OFF OR FALL ASLEEP WHILE SITTING AND TALKING TO SOMEONE: WOULD NEVER DOZE
HOW LIKELY ARE YOU TO NOD OFF OR FALL ASLEEP WHEN YOU ARE A PASSENGER IN A CAR FOR AN HOUR WITHOUT A BREAK: WOULD NEVER DOZE
HOW LIKELY ARE YOU TO NOD OFF OR FALL ASLEEP WHILE WATCHING TV: WOULD NEVER DOZE
HOW LIKELY ARE YOU TO NOD OFF OR FALL ASLEEP WHILE SITTING QUIETLY AFTER LUNCH WITHOUT ALCOHOL: WOULD NEVER DOZE
HOW LIKELY ARE YOU TO NOD OFF OR FALL ASLEEP WHILE SITTING INACTIVE IN A PUBLIC PLACE: WOULD NEVER DOZE

## 2022-01-04 ASSESSMENT — PAIN SCALES - GENERAL: PAINLEVEL: NO PAIN (0)

## 2022-01-04 ASSESSMENT — MIFFLIN-ST. JEOR: SCORE: 2196.74

## 2022-01-04 NOTE — PATIENT INSTRUCTIONS

## 2022-01-04 NOTE — PROGRESS NOTES
Sim is a 41 year old who is being evaluated via a billable video visit.      How would you like to obtain your AVS? MyChart  If the video visit is dropped, the invitation should be resent by: Text to cell phone: [phone]   Will anyone else be joining your video visit? No    Video Start Time: 9:24 AM  Video-Visit Details    Type of service:  Video Visit    Video End Time:9:36 AM    Originating Location (pt. Location): Home    Distant Location (provider location):  [facility] [place]     Platform [facility] for Video Visit: [facility]     Medication and allergies have been reviewed.     MARÍA ELENA LuoDamián Linda      Additional 15 minutes on the date of service was spent performing the following:    -Preparing to see the patient  -Obtaining and/or reviewing separately obtained history   -Ordering medications, tests, or procedures   -Documenting clinical information in the electronic or other health record     Thank you for the opportunity to participate in the care of Shady Rocha.     He is a 41 year old y/o male patient who comes to the sleep medicine clinic for follow up.  The patient was diagnosed with JOSE at our facility on 10/21/19 (QEB=360.8). The patient continues to benefit from CPAP therapy. He likes his current pressure settings.     Assessment and Plan:  In summary Shady Rocha is a 41 year old year old male who is here for follow up.    1. Obstructive sleep apnea  I congratulated the patient on his excellent CPAP usage. I will keep him on the same pressure range. I welcome him to follow up with me bi-annually.  - COMPREHENSIVE DME     Compliance Download data for 30 Days:  Pressure setting:APAP 7-13.4 cwp  95% pressure:10 cwp  Residual AHI:2.3 events per hour  Leak:Minimal  Compliance:100%  Mask Tolerance:Good  Skin irritation:None  DME:[facility]    Sleep-Wake Cycle:    The patient likes to initiate sleep at around 10:30-11 PM with a sleep latency of less than 10 minutes. The  patient has rarely nocturnal awakenings. Final wake up time is around 7 AM.    SORAIDA:  SORAIDA Total Score: 3  Total score - [place]: 1 (1/4/2022  8:10 AM)        Patient Active Problem List   Diagnosis     Hypertension     GERD (gastroesophageal reflux disease)     Adrenal adenoma     Drug-induced erectile dysfunction     Obesity (BMI 35.0-39.9) with comorbidity (H)     JOSE (obstructive sleep apnea)     Mixed hyperlipidemia       Past Medical History:   Diagnosis Date     Gastroesophageal reflux disease without esophagitis        Past Surgical History:   Procedure Laterality Date     DENTAL SURGERY         Social History     Socioeconomic History     Marital status:      Spouse name: Not on file     Number of children: Not on file     Years of education: Not on file     Highest education level: Not on file   Occupational History     Not on file   Tobacco Use     Smoking status: Never Smoker     Smokeless tobacco: Never Used   Substance and Sexual Activity     Alcohol use: Yes     Comment: Alcoholic Drinks/day: 7 drinks per week     Drug use: Not Currently     Sexual activity: Yes     Partners: Female   Other Topics Concern     Not on file   Social History Narrative    Works for [employer].      Social Determinants of Health     Financial Resource Strain: Not on file   Food Insecurity: Not on file   Transportation Needs: Not on file   Physical Activity: Not on file   Stress: Not on file   Social Connections: Not on file   Intimate Partner Violence: Not on file   Housing Stability: Not on file       Current Outpatient Medications   Medication Sig Dispense Refill     lansoprazole (PREVACID) 30 MG capsule [LANSOPRAZOLE (PREVACID) 30 MG CAPSULE] Take 30 mg by mouth daily as needed.       losartan (COZAAR) 100 MG tablet [LOSARTAN (COZAAR) 100 MG TABLET] TAKE 1 TABLET(100 MG) BY MOUTH DAILY FOR BLOOD PRESSURE. Due to schedule MD visit 90 tablet 1     tadalafil (CIALIS) 10 MG tablet Take 10 mg by mouth daily as needed          Allergies   Allergen Reactions     Sulfa (Sulfonamide Antibiotics) [Sulfa Drugs] Unknown       Physical Exam:  GEN: NAD,  Psych: normal mood, normal affect    Labs/Studies:    No results found for: ELAN    I reviewed the efficacy and compliance report from his device. Data summarized on the HPI and the PAP compliance flow sheet.     Patient verbalized understanding of these issues, agrees with the plan and all questions were answered today. Patient was given an opportuntity to voice any other symptoms or concerns not listed above. Patient did not have any other symptoms or concerns.      Dominic Solano DO  Board Certified in Internal Medicine and Sleep Medicine    (Note created with Dragon voice recognition and unintended spelling errors and word substitutions may occur)     Audio and visual devices were used for this virtual clinic visit with permission from patient.

## 2022-07-03 DIAGNOSIS — I10 BENIGN ESSENTIAL HYPERTENSION: ICD-10-CM

## 2022-07-03 RX ORDER — LOSARTAN POTASSIUM 100 MG/1
TABLET ORAL
Qty: 90 TABLET | Refills: 0 | Status: SHIPPED | OUTPATIENT
Start: 2022-07-03 | End: 2022-10-03

## 2022-07-03 NOTE — TELEPHONE ENCOUNTER
"Last Written Prescription Date:  1/5/2022  Last Fill Quantity: 90,  # refills: 1   Last office visit provider:  10/25/2022 with Dr GIUSEPPE Blackwood    Requested Prescriptions   Pending Prescriptions Disp Refills     losartan (COZAAR) 100 MG tablet [Pharmacy Med Name: LOSARTAN 100MG TABLETS] 90 tablet 1     Sig: TAKE 1 TABLET BY MOUTH DAILY FOR BLOOD PRESSURE       Angiotensin-II Receptors Passed - 7/3/2022  3:26 AM        Passed - Last blood pressure under 140/90 in past 12 months     BP Readings from Last 3 Encounters:   10/25/21 126/88   06/26/20 132/89   01/29/20 131/83                 Passed - Recent (12 mo) or future (30 days) visit within the authorizing provider's specialty     Patient has had an office visit with the authorizing provider or a provider within the authorizing providers department within the previous 12 mos or has a future within next 30 days. See \"Patient Info\" tab in inbasket, or \"Choose Columns\" in Meds & Orders section of the refill encounter.              Passed - Medication is active on med list        Passed - Patient is age 18 or older        Passed - Normal serum creatinine on file in past 12 months     Recent Labs   Lab Test 10/25/21  1351   CR 1.12       Ok to refill medication if creatinine is low          Passed - Normal serum potassium on file in past 12 months     Recent Labs   Lab Test 10/25/21  1351   POTASSIUM 4.0                         Constanza De La Vega RN 07/03/22 3:52 PM  "

## 2022-09-11 ENCOUNTER — HEALTH MAINTENANCE LETTER (OUTPATIENT)
Age: 42
End: 2022-09-11

## 2022-10-03 DIAGNOSIS — I10 BENIGN ESSENTIAL HYPERTENSION: ICD-10-CM

## 2022-10-03 RX ORDER — LOSARTAN POTASSIUM 100 MG/1
TABLET ORAL
Qty: 90 TABLET | Refills: 0 | Status: SHIPPED | OUTPATIENT
Start: 2022-10-03 | End: 2023-01-07

## 2022-10-03 NOTE — TELEPHONE ENCOUNTER
"Last Written Prescription Date:  7/3/22  Last Fill Quantity: 90,  # refills: 0   Last office visit provider:  10/25/21     Requested Prescriptions   Pending Prescriptions Disp Refills     losartan (COZAAR) 100 MG tablet [Pharmacy Med Name: LOSARTAN 100MG TABLETS] 90 tablet 0     Sig: TAKE 1 TABLET BY MOUTH DAILY FOR BLOOD PRESSURE       Angiotensin-II Receptors Passed - 10/3/2022  2:31 PM        Passed - Last blood pressure under 140/90 in past 12 months     BP Readings from Last 3 Encounters:   10/25/21 126/88   06/26/20 132/89   01/29/20 131/83                 Passed - Recent (12 mo) or future (30 days) visit within the authorizing provider's specialty     Patient has had an office visit with the authorizing provider or a provider within the authorizing providers department within the previous 12 mos or has a future within next 30 days. See \"Patient Info\" tab in inbasket, or \"Choose Columns\" in Meds & Orders section of the refill encounter.              Passed - Medication is active on med list        Passed - Patient is age 18 or older        Passed - Normal serum creatinine on file in past 12 months     Recent Labs   Lab Test 10/25/21  1351   CR 1.12       Ok to refill medication if creatinine is low          Passed - Normal serum potassium on file in past 12 months     Recent Labs   Lab Test 10/25/21  1351   POTASSIUM 4.0                         Mohsen Meraz RN 10/03/22 2:31 PM  "

## 2022-10-31 ENCOUNTER — OFFICE VISIT (OUTPATIENT)
Dept: FAMILY MEDICINE | Facility: CLINIC | Age: 42
End: 2022-10-31
Payer: COMMERCIAL

## 2022-10-31 VITALS
HEART RATE: 73 BPM | WEIGHT: 297 LBS | SYSTOLIC BLOOD PRESSURE: 132 MMHG | DIASTOLIC BLOOD PRESSURE: 101 MMHG | BODY MASS INDEX: 39.36 KG/M2 | HEIGHT: 73 IN

## 2022-10-31 DIAGNOSIS — E66.01 MORBID OBESITY (H): ICD-10-CM

## 2022-10-31 DIAGNOSIS — I45.10 RBBB: ICD-10-CM

## 2022-10-31 DIAGNOSIS — R73.09 ELEVATED GLUCOSE: ICD-10-CM

## 2022-10-31 DIAGNOSIS — Z00.00 ROUTINE GENERAL MEDICAL EXAMINATION AT A HEALTH CARE FACILITY: Primary | ICD-10-CM

## 2022-10-31 DIAGNOSIS — I10 PRIMARY HYPERTENSION: ICD-10-CM

## 2022-10-31 DIAGNOSIS — R07.9 CHEST PAIN, UNSPECIFIED TYPE: ICD-10-CM

## 2022-10-31 DIAGNOSIS — N52.9 ERECTILE DYSFUNCTION, UNSPECIFIED ERECTILE DYSFUNCTION TYPE: ICD-10-CM

## 2022-10-31 DIAGNOSIS — E78.2 MIXED HYPERLIPIDEMIA: ICD-10-CM

## 2022-10-31 LAB
ALBUMIN SERPL BCG-MCNC: 4.4 G/DL (ref 3.5–5.2)
ALP SERPL-CCNC: 60 U/L (ref 40–129)
ALT SERPL W P-5'-P-CCNC: 40 U/L (ref 10–50)
ANION GAP SERPL CALCULATED.3IONS-SCNC: 11 MMOL/L (ref 7–15)
AST SERPL W P-5'-P-CCNC: 31 U/L (ref 10–50)
ATRIAL RATE - MUSE: 70 BPM
BILIRUB SERPL-MCNC: 0.3 MG/DL
BUN SERPL-MCNC: 13 MG/DL (ref 6–20)
CALCIUM SERPL-MCNC: 9.1 MG/DL (ref 8.6–10)
CHLORIDE SERPL-SCNC: 105 MMOL/L (ref 98–107)
CHOLEST SERPL-MCNC: 201 MG/DL
CREAT SERPL-MCNC: 1.07 MG/DL (ref 0.67–1.17)
DEPRECATED HCO3 PLAS-SCNC: 25 MMOL/L (ref 22–29)
DIASTOLIC BLOOD PRESSURE - MUSE: NORMAL MMHG
GFR SERPL CREATININE-BSD FRML MDRD: 88 ML/MIN/1.73M2
GLUCOSE SERPL-MCNC: 107 MG/DL (ref 70–99)
HDLC SERPL-MCNC: 43 MG/DL
INTERPRETATION ECG - MUSE: NORMAL
LDLC SERPL CALC-MCNC: 134 MG/DL
NONHDLC SERPL-MCNC: 158 MG/DL
P AXIS - MUSE: 11 DEGREES
POTASSIUM SERPL-SCNC: 4.3 MMOL/L (ref 3.4–5.3)
PR INTERVAL - MUSE: 144 MS
PROT SERPL-MCNC: 7 G/DL (ref 6.4–8.3)
QRS DURATION - MUSE: 144 MS
QT - MUSE: 400 MS
QTC - MUSE: 432 MS
R AXIS - MUSE: 30 DEGREES
SODIUM SERPL-SCNC: 141 MMOL/L (ref 136–145)
SYSTOLIC BLOOD PRESSURE - MUSE: NORMAL MMHG
T AXIS - MUSE: 13 DEGREES
TRIGL SERPL-MCNC: 122 MG/DL
VENTRICULAR RATE- MUSE: 70 BPM

## 2022-10-31 PROCEDURE — 90471 IMMUNIZATION ADMIN: CPT | Performed by: FAMILY MEDICINE

## 2022-10-31 PROCEDURE — 0124A COVID-19,PF,PFIZER BOOSTER BIVALENT: CPT | Performed by: FAMILY MEDICINE

## 2022-10-31 PROCEDURE — 80061 LIPID PANEL: CPT | Performed by: FAMILY MEDICINE

## 2022-10-31 PROCEDURE — 99396 PREV VISIT EST AGE 40-64: CPT | Mod: 25 | Performed by: FAMILY MEDICINE

## 2022-10-31 PROCEDURE — 80053 COMPREHEN METABOLIC PANEL: CPT | Performed by: FAMILY MEDICINE

## 2022-10-31 PROCEDURE — 36415 COLL VENOUS BLD VENIPUNCTURE: CPT | Performed by: FAMILY MEDICINE

## 2022-10-31 PROCEDURE — 99214 OFFICE O/P EST MOD 30 MIN: CPT | Mod: 25 | Performed by: FAMILY MEDICINE

## 2022-10-31 PROCEDURE — 90686 IIV4 VACC NO PRSV 0.5 ML IM: CPT | Performed by: FAMILY MEDICINE

## 2022-10-31 PROCEDURE — 93005 ELECTROCARDIOGRAM TRACING: CPT | Performed by: FAMILY MEDICINE

## 2022-10-31 PROCEDURE — 93010 ELECTROCARDIOGRAM REPORT: CPT | Performed by: INTERNAL MEDICINE

## 2022-10-31 PROCEDURE — 91312 COVID-19,PF,PFIZER BOOSTER BIVALENT: CPT | Performed by: FAMILY MEDICINE

## 2022-10-31 ASSESSMENT — ENCOUNTER SYMPTOMS
CHILLS: 0
FEVER: 0
HEADACHES: 0
SORE THROAT: 0
COUGH: 0
HEMATOCHEZIA: 0
NERVOUS/ANXIOUS: 0
PALPITATIONS: 0
ABDOMINAL PAIN: 0
PARESTHESIAS: 0
DIZZINESS: 0
ARTHRALGIAS: 0
DIARRHEA: 0
HEMATURIA: 0
WEAKNESS: 0
NAUSEA: 0
HEARTBURN: 0
DYSURIA: 0
SHORTNESS OF BREATH: 0
JOINT SWELLING: 0
MYALGIAS: 0
CONSTIPATION: 0
EYE PAIN: 0
FREQUENCY: 0

## 2022-10-31 NOTE — PROGRESS NOTES
SUBJECTIVE:   CC: Sim is an 42 year old who presents for preventative health visit.     Chief Complaint   Patient presents with     Physical     fasting     Chest Pain     Pain in rib/ chest area comes and goes       Patient has been advised of split billing requirements and indicates understanding: Yes  Healthy Habits:     Getting at least 3 servings of Calcium per day:  Yes    Bi-annual eye exam:  NO    Dental care twice a year:  Yes    Sleep apnea or symptoms of sleep apnea:  Sleep apnea    Diet:  Regular (no restrictions)    Frequency of exercise:  2-3 days/week    Duration of exercise:  30-45 minutes    Taking medications regularly:  Yes    Medication side effects:  None    PHQ-2 Total Score: 0    Additional concerns today:  Yes    Occasionally checks bps at home but has not since changing from nighttime administration to morning.  Weight gain since last visit.  noom 1.5 years ago lost 10-15 lbs. May restart that.     Dull slight pain left chest x 1 month.  Typically sleeps on right side but has been changing to his left side, wonders if this is contributing. Reflux worse when sleeping on back. recently dug hole under his house and wonders if it is muscle strain though unable to reproduce the pain with movements. Sometimes worsens after eating, air bubble can slightly worsen it. No sob, n/v.     Blue chew for erectile dysfunction. Makes him stuffy. Takes as needed 30 min prior to intercourse.     Today's PHQ-2 Score:   PHQ-2 ( 1999 [employer]) 10/31/2022   Q1: Little interest or pleasure in doing things 0   Q2: Feeling down, depressed or hopeless 0   PHQ-2 Score 0   PHQ-2 Total Score (12-17 Years)- Positive if 3 or more points; Administer PHQ-A if positive -   Q1: Little interest or pleasure in doing things Not at all   Q2: Feeling down, depressed or hopeless Not at all   PHQ-2 Score 0       Abuse: Current or Past(Physical, Sexual or Emotional)- No  Do you feel safe in your environment? Yes    Social History  "    Tobacco Use     Smoking status: Never     Smokeless tobacco: Never   Substance Use Topics     Alcohol use: Yes     Comment: Alcoholic Drinks/day: 7 drinks per week     If you drink alcohol do you typically have >3 drinks per day or >7 drinks per week? No    Alcohol Use 10/31/2022   Prescreen: >3 drinks/day or >7 drinks/week? No   Prescreen: >3 drinks/day or >7 drinks/week? -   No flowsheet data found.    Last PSA:   Prostate Specific Antigen Screen   Date Value Ref Range Status   11/13/2018 0.4 0.0 - 2.5 ng/mL Final       Reviewed orders with patient. Reviewed health maintenance and updated orders accordingly - Yes  Lab work is in process  Labs reviewed in EPIC    Reviewed and updated as needed this visit by clinical staff   Tobacco  Allergies  Meds  Problems  Med Hx  Surg Hx  Fam Hx          Reviewed and updated as needed this visit by Provider   Tobacco  Allergies  Meds  Problems  Med Hx  Surg Hx  Fam Hx             Review of Systems   Constitutional: Negative for chills and fever.   HENT: Negative for congestion, ear pain, hearing loss and sore throat.    Eyes: Negative for pain and visual disturbance.   Respiratory: Negative for cough and shortness of breath.    Cardiovascular: Negative for chest pain, palpitations and peripheral edema.   Gastrointestinal: Negative for abdominal pain, constipation, diarrhea, heartburn, hematochezia and nausea.   Genitourinary: Positive for impotence. Negative for dysuria, frequency, genital sores, hematuria, penile discharge and urgency.   Musculoskeletal: Negative for arthralgias, joint swelling and myalgias.   Skin: Negative for rash.   Neurological: Negative for dizziness, weakness, headaches and paresthesias.   Psychiatric/Behavioral: Negative for mood changes. The patient is not nervous/anxious.      OBJECTIVE:   BP (!) 132/101   Pulse 73   Ht 1.854 m (6' 1\")   Wt 134.7 kg (297 lb)   BMI 39.18 kg/m      Physical Exam  GENERAL: alert and no " "distress  EYES: Eyes grossly normal to inspection, PERRL and conjunctivae and sclerae normal  HENT: ear canals and TM's normal, nose and mouth without ulcers or lesions  NECK: no adenopathy, no asymmetry, masses, or scars and thyroid normal to palpation  RESP: lungs clear to auscultation - no rales, rhonchi or wheezes  CV: regular rate and rhythm, normal S1 S2, no S3 or S4, no murmur, click or rub, no peripheral edema, no reproducible pain with palpation of left anterior chest wall  ABDOMEN: soft, nontender, no hepatosplenomegaly, no masses  MS: no gross musculoskeletal defects noted, no edema  SKIN: no suspicious lesions or rashes  NEURO: Normal strength and tone, mentation intact and speech normal  PSYCH: mentation appears normal, affect normal/bright    Diagnostic Test Results:  Labs reviewed in Epic    ASSESSMENT/PLAN:   Shady was seen today for physical and chest pain.    Diagnoses and all orders for this visit:    Routine general medical examination at a health care facility  COUNSELING:   Reviewed preventive health counseling, as reflected in patient instructions       Regular exercise       Healthy diet/nutrition       Vision screening       Immunizations    Vaccinated for: Influenza and COVID       Alcohol Use        The 10-year ASCVD risk score (Omayra HARDING, et al., 2019) is: 3.2%    Values used to calculate the score:      Age: 42 years      Sex: Male      Is Non- : No      Diabetic: No      Tobacco smoker: No      Systolic Blood Pressure: 143 mmHg      Is BP treated: Yes      HDL Cholesterol: 40 mg/dL      Total Cholesterol: 205 mg/dL       Advance Care Planning    Estimated body mass index is 39.18 kg/m  as calculated from the following:    Height as of this encounter: 1.854 m (6' 1\").    Weight as of this encounter: 134.7 kg (297 lb).     Weight management plan: Discussed healthy diet and exercise guidelines    He reports that he has never smoked. He has never used smokeless " "tobacco.    Primary hypertension  Currently on losartan 100 mg daily.  Had been taking this in the evening recently changed to morning administration.  Blood pressure within goal today.  Update monitoring labs.  -     Comprehensive metabolic panel (BMP + Alb, Alk Phos, ALT, AST, Total. Bili, TP); Future      Obesity (BMI 35.0-39.9) with comorbidity (H)  Mixed hyperlipidemia  Discussed importance of weight loss through diet/exercise.  Not currently on statin for hyperlipidemia, reassess fasting lipids and ASCVD risk today.  -     Lipid Profile (Chol, Trig, HDL, LDL calc); Future  -     Comprehensive metabolic panel (BMP + Alb, Alk Phos, ALT, AST, Total. Bili, TP); Future    Chest pain, unspecified type  Dull left-sided chest pain x1 month without associated symptoms.  Difficult to determine etiology based on history since patient is not aware what exacerbates his pain and I am unable to reproduce it on exam today.  EKG shows new RBBB and given increased risk for early CAD due to obesity, hyperlipidemia, hypertension, JOSE will pursue stress testing.  -     EKG 12-lead, tracing only  -     NM Lexiscan stress test; Future    Erectile dysfunction, unspecified erectile dysfunction type  Currently supplementing with \"blue chew\" or cialis. Discussed increased risk of taking these medications until we work-up his chest pain further.  If unremarkable cardiac work-up, could consider a dose adjustment or meeting with urology.  He notes some association with his losartan medicine so he may be interested in changing to an alternative BP med in the future.  Again readdressed importance of weight loss, blood pressure, and cholesterol control.    Other orders  -     INFLUENZA VACCINE IM > 6 MONTHS VALENT IIV4 (AFLURIA/FLUZONE)  -     COVID-19,PF,PFIZER BOOSTER BIVALENT (12+YRS    Patient has been advised of split billing requirements and indicates understanding: Yes        Ingrid Blackwood,   [facility] " [place]

## 2022-10-31 NOTE — PATIENT INSTRUCTIONS
[phone] to schedule stress test    Preventive Health Recommendations  Male Ages 40 to 49    Yearly exam:             See your health care provider every year in order to  o   Review health changes.   o   Discuss preventive care.    o   Review your medicines if your doctor has prescribed any.  You should be tested each year for STDs (sexually transmitted diseases) if you re at risk.   Have a cholesterol test every 5 years.   Have a colonoscopy (test for colon cancer) if someone in your family has had colon cancer or polyps before age 50.   After age 45, have a diabetes test (fasting glucose). If you are at risk for diabetes, you should have this test every 3 years.    Talk with your health care provider about whether or not a prostate cancer screening test (PSA) is right for you.    Shots: Get a flu shot each year. Get a tetanus shot every 10 years.     Nutrition:  Eat at least 5 servings of fruits and vegetables daily.   Eat whole-grain bread, whole-wheat pasta and brown rice instead of white grains and rice.   Get adequate Calcium and Vitamin D.     Lifestyle  Exercise for at least 150 minutes a week (30 minutes a day, 5 days a week). This will help you control your weight and prevent disease.   Limit alcohol to one drink per day.   No smoking.   Wear sunscreen to prevent skin cancer.   See your dentist every six months for an exam and cleaning.

## 2022-11-01 PROBLEM — I45.10 RBBB: Status: ACTIVE | Noted: 2022-11-01

## 2022-11-02 ENCOUNTER — LAB (OUTPATIENT)
Dept: LAB | Facility: CLINIC | Age: 42
End: 2022-11-02
Payer: COMMERCIAL

## 2022-11-02 DIAGNOSIS — R73.09 ELEVATED GLUCOSE: ICD-10-CM

## 2022-11-02 PROBLEM — R73.03 PREDIABETES: Status: ACTIVE | Noted: 2022-11-02

## 2022-11-02 LAB — HBA1C MFR BLD: 5.9 % (ref 0–5.6)

## 2022-11-02 PROCEDURE — 83036 HEMOGLOBIN GLYCOSYLATED A1C: CPT

## 2022-11-02 PROCEDURE — 36415 COLL VENOUS BLD VENIPUNCTURE: CPT

## 2022-11-04 ENCOUNTER — HOSPITAL ENCOUNTER (OUTPATIENT)
Dept: NUCLEAR MEDICINE | Facility: HOSPITAL | Age: 42
Discharge: HOME | End: 2022-11-04
Attending: FAMILY MEDICINE
Payer: COMMERCIAL

## 2022-11-04 ENCOUNTER — HOSPITAL ENCOUNTER (OUTPATIENT)
Dept: CARDIOLOGY | Facility: HOSPITAL | Age: 42
Discharge: HOME | End: 2022-11-04
Attending: FAMILY MEDICINE
Payer: COMMERCIAL

## 2022-11-04 DIAGNOSIS — R07.9 CHEST PAIN, UNSPECIFIED TYPE: ICD-10-CM

## 2022-11-04 LAB
CV STRESS CURRENT BP HE: NORMAL
CV STRESS CURRENT HR HE: 101
CV STRESS CURRENT HR HE: 108
CV STRESS CURRENT HR HE: 117
CV STRESS CURRENT HR HE: 123
CV STRESS CURRENT HR HE: 124
CV STRESS CURRENT HR HE: 127
CV STRESS CURRENT HR HE: 130
CV STRESS CURRENT HR HE: 132
CV STRESS CURRENT HR HE: 137
CV STRESS CURRENT HR HE: 145
CV STRESS CURRENT HR HE: 147
CV STRESS CURRENT HR HE: 148
CV STRESS CURRENT HR HE: 150
CV STRESS CURRENT HR HE: 150
CV STRESS CURRENT HR HE: 156
CV STRESS CURRENT HR HE: 75
CV STRESS CURRENT HR HE: 80
CV STRESS CURRENT HR HE: 93
CV STRESS CURRENT HR HE: 94
CV STRESS CURRENT HR HE: 96
CV STRESS CURRENT HR HE: 97
CV STRESS CURRENT HR HE: 98
CV STRESS DEVIATION TIME HE: NORMAL
CV STRESS ECHO PERCENT HR HE: NORMAL
CV STRESS EXERCISE STAGE HE: NORMAL
CV STRESS EXERCISE STAGE REACHED HE: NORMAL
CV STRESS FINAL RESTING BP HE: NORMAL
CV STRESS FINAL RESTING HR HE: 94
CV STRESS MAX HR HE: 158
CV STRESS MAX TREADMILL GRADE HE: 14
CV STRESS MAX TREADMILL SPEED HE: 3.4
CV STRESS PEAK DIA BP HE: NORMAL
CV STRESS PEAK SYS BP HE: NORMAL
CV STRESS PHASE HE: NORMAL
CV STRESS PROTOCOL HE: NORMAL
CV STRESS RESTING PT POSITION HE: NORMAL
CV STRESS RESTING PT POSITION HE: NORMAL
CV STRESS ST DEVIATION AMOUNT HE: NORMAL
CV STRESS ST DEVIATION ELEVATION HE: NORMAL
CV STRESS ST EVELATION AMOUNT HE: NORMAL
CV STRESS TEST TYPE HE: NORMAL
CV STRESS TOTAL STAGE TIME MIN 1 HE: NORMAL
NUC STRESS EJECTION FRACTION: 70 %
RATE PRESSURE PRODUCT: NORMAL
STRESS ECHO BASELINE DIASTOLIC HE: 91
STRESS ECHO BASELINE HR: 79
STRESS ECHO BASELINE SYSTOLIC BP: 147
STRESS ECHO CALCULATED PERCENT HR: 89 %
STRESS ECHO LAST STRESS DIASTOLIC BP: 90
STRESS ECHO LAST STRESS HR: 156
STRESS ECHO LAST STRESS SYSTOLIC BP: 202
STRESS ECHO TARGET HR: 177
STRESS ST DEPRESSION: 1.2 MM

## 2022-11-04 PROCEDURE — A9500 TC99M SESTAMIBI: HCPCS | Performed by: FAMILY MEDICINE

## 2022-11-04 PROCEDURE — 343N000001 HC RX 343: Performed by: FAMILY MEDICINE

## 2022-11-04 PROCEDURE — 78452 HT MUSCLE IMAGE SPECT MULT: CPT

## 2022-11-04 PROCEDURE — 93017 CV STRESS TEST TRACING ONLY: CPT

## 2022-11-04 PROCEDURE — 78452 HT MUSCLE IMAGE SPECT MULT: CPT | Mod: 26 | Performed by: INTERNAL MEDICINE

## 2022-11-04 PROCEDURE — 93016 CV STRESS TEST SUPVJ ONLY: CPT | Performed by: INTERNAL MEDICINE

## 2022-11-04 PROCEDURE — 93018 CV STRESS TEST I&R ONLY: CPT | Performed by: INTERNAL MEDICINE

## 2022-11-04 RX ADMIN — Medication 38.7 MILLICURIE: at 10:01

## 2022-11-04 RX ADMIN — Medication 10.01 MCI.: at 08:31

## 2022-11-06 ENCOUNTER — E-VISIT (OUTPATIENT)
Dept: FAMILY MEDICINE | Facility: CLINIC | Age: 42
End: 2022-11-06
Payer: COMMERCIAL

## 2022-11-06 DIAGNOSIS — J01.90 ACUTE NON-RECURRENT SINUSITIS, UNSPECIFIED LOCATION: Primary | ICD-10-CM

## 2022-11-06 PROCEDURE — 99421 OL DIG E/M SVC 5-10 MIN: CPT | Performed by: FAMILY MEDICINE

## 2022-11-07 NOTE — PATIENT INSTRUCTIONS
You may want to try a nasal lavage (also known as nasal irrigation). You can find over-the-counter products, such as Neti-Pot, at retail locations or make your own at home. Instructions for homemade nasal lavage and more information on the process are available online at http://www.aafp.org/[email or web address]/1115/p1121.html.      When to Use Antibiotics    Antibiotics are medicines used to treat infections caused by bacteria. They don t work for an illness caused by a virus. And they don't work for an allergic reaction. In fact, taking antibiotics for reasons other than an infection by bacteria can cause problems. You may have side effects from the medicine. And if you need an antibiotic in the future, it may not work well. This is because the bacteria can become immune to the medicine. You can also get a type of diarrhea that's hard to treat. This diarrhea is called C. diff.   When antibiotics likely won t help  Your healthcare provider won t usually give you antibiotics for the conditions listed below. You can help by not asking for them if you have:     A cold. This type of illness is caused by a virus. It can cause a runny nose, stuffed-up nose, sneezing, coughing, and headache. You may also have mild body aches and low fever. A cold gets better on its own in a few days to a week.    The flu (influenza). This is a respiratory illness caused by a virus. The flu usually goes away on its own in a week or so. It can cause fever, body aches, sore throat, and tiredness.    Bronchitis. This is an infection in the lungs. It is most often caused by a virus. You may have coughing, phlegm, body aches, and a low fever. A common type of bronchitis is known as a chest cold. This is called acute bronchitis. This often happens after you have a respiratory infection like a cold. Bronchitis can take weeks to go away. Antibiotics often don t help.    Most sore throats. Sore throats are most often caused by viruses. Your throat may feel  scratchy or achy. It may hurt to swallow. You may also have a low fever and body aches. A sore throat usually gets better in a few days.    Most outer ear infections. An ear infection may be caused by a virus or bacteria. It causes pain in the ear. Antibiotics by mouth usually don t help. Low-dose antibiotic ear drops work much better.    Some inner ear infections. An inner ear infection (otitis media) can be caused by a virus in the ear. It can also cause pain and a high fever. Most older children with low-grade fever don't need to be treated with antibiotics.    Most sinus infections. This is also known as sinusitis. This kind of infection causes sinus pain and swelling, and a runny nose. In most cases, it goes away on its own. Antibiotics don t make recovery quicker.    Allergic rhinitis. This is a set of symptoms caused by an allergic reaction. You may have sneezing, a runny nose, itchy or watery eyes, or a sore throat. Allergies are not treated with antibiotics.    Low fever. A mild fever that s less than 100.4 F (38 C) most likely doesn t need to be treated with antibiotics.   When antibiotics can help  Antibiotics can be used to treat:                                                       Strep throat. This is a throat infection caused by a certain type of bacteria. Symptoms of strep throat include a sore throat, white patches on the tonsils, red spots on the roof of the mouth, fever, body aches, and nausea and vomiting. Strep throat almost never causes a cough.    Urinary tract infection (UTI). This is an infection of the bladder and the tube that takes urine out of the body. It is caused by bacteria. It can cause burning pain and urine that s cloudy or tinted with blood. UTIs are very common. Antibiotics usually help treat them.    Some outer ear infections. In some cases, a healthcare provider may prescribe antibiotics by mouth for an ear infection. You may need a test to show the cause of the ear  infection.    Some sinus infections. In some cases, your healthcare provider may give you antibiotics. He or she may first need to make sure your symptoms aren t caused by something else. This may be a virus, fungus, allergies, or air pollutants such as smoke.   Your healthcare provider may give you antibiotics if you have a condition that can affect your immune system. This includes diabetes or cancer.  Self-care at home  If your infection can t be treated with antibiotics, you can take other steps to feel better. Try the remedies below. In general:     Rest and sleep as much as needed.    Drink water and other clear fluids.    Don t smoke. Stay away from smoke from other people.    Use over-the-counter medicine such as acetaminophen or ibuprofen to ease pain or fever, as directed by your healthcare provider.  To treat sinus pain or nasal stuffiness:    Put a warm, moist cloth on your face where you feel sinus pain or pressure.    Try a nasal spray with medicine or saline. Use as directed by your healthcare provider.    Breathe in steam from a hot shower.    Use a humidifier or cool mist vaporizer.   To quiet a cough:     Use a humidifier or cool mist vaporizer.    Breathe in steam from a hot shower.    Suck on cough lozenges.   To sooth a sore throat:     Suck on ice chips, frozen ice pops, or lozenges.    Use a sore throat spray.    Use a humidifier or cool mist vaporizer.    Gargle with saltwater.    Drink warm liquids.    Take ibuprofen to reduce swelling and pain.  To ease ear pain:     Hold a warm, moist washcloth on the ear for 10 minutes at a time.  [employer] last reviewed this educational content on 12/1/2019 2000-2021 [employer]. All rights reserved. This information is not intended as a substitute for professional medical care. Always follow your healthcare professional's instructions.        Dear Shady Rocha    After reviewing your responses, I've been able to diagnose you with?a  sinus infection caused by a virus.?Although similar symptoms to a sinus infection caused by bacteria, most begin as viral and antibiotics are only needed if symptoms are not improving after 7-10 days. Please ensure you have also tested for covid with a home covid test, or let me know if you need an order to be swabbed in clinic.     It is also important to stay well hydrated, get lots of rest and take over-the-counter decongestants,?tylenol?or ibuprofen if you?are able to?take those medications per your primary care provider to help relieve discomfort.? Decongestants such as mucinex can be helpful, along with saline or fluticasone nasal sprays which are available ovr the counter.     If your symptoms worsen, you develop severe headache, vomiting, high fever (>102), or are not improving by the end of the week, please send me a [employer] message and I can send antibiotics to your pharmacy to help.    Thanks again for choosing?us?as your health care partner,?   ?  Ingrid Blackwood, DO?

## 2022-11-11 ENCOUNTER — E-VISIT (OUTPATIENT)
Dept: OBGYN | Facility: CLINIC | Age: 42
End: 2022-11-11
Payer: COMMERCIAL

## 2022-11-11 DIAGNOSIS — J01.90 ACUTE NON-RECURRENT SINUSITIS, UNSPECIFIED LOCATION: Primary | ICD-10-CM

## 2022-11-11 PROCEDURE — 99207 PR NO CHARGE LOS: CPT | Performed by: FAMILY MEDICINE

## 2022-11-11 NOTE — PATIENT INSTRUCTIONS
Sinusitis (Antibiotic Treatment)    The sinuses are air-filled spaces within the bones of the face. They connect to the inside of the nose. Sinusitis is an inflammation of the tissue that lines the sinuses. Sinusitis can occur during a cold. It can also happen due to allergies to pollens and other particles in the air. Sinusitis can cause symptoms of sinus congestion and a feeling of fullness. A sinus infection causes fever, headache, and facial pain. There is often green or yellow fluid draining from the nose or into the back of the throat (post-nasal drip). You have been given antibiotics to treat this condition.   Home care    Take the full course of antibiotics as instructed. Don't stop taking them, even when you feel better.    Drink plenty of water, hot tea, and other liquids as directed by the healthcare provider. This may help thin nasal mucus. It also may help your sinuses drain fluids.    Heat may help soothe painful areas of your face. Use a towel soaked in hot water. Or,  the shower and direct the warm spray onto your face. Using a vaporizer along with a menthol rub at night may also help soothe symptoms.     An expectorant with guaifenesin may help thin nasal mucus and help your sinuses drain fluids. Talk with your provider or pharmacists before taking an over-the-counter (OTC) medicine if you have any questions about it or its side effects..    You can use an OTC decongestant, unless a similar medicine was prescribed to you. Nasal sprays work the fastest. Use one that contains phenylephrine or oxymetazoline. First blow your nose gently. Then use the spray. Don't use these medicines more often than directed on the label. If you do, your symptoms may get worse. You may also take pills that contain pseudoephedrine. Don t use products that combine multiple medicines. This is because side effects may be increased. Read labels. You can also ask the pharmacist for help. (People with high blood  pressure should not use decongestants. They can raise blood pressure.) Talk with your provider or pharmacist if you have any questions about the medicine..    OTC antihistamines may help if allergies contributed to your sinusitis. Talk with your provider or pharmacist if you have any questions about the medicine..    Don't use nasal rinses or irrigation during an acute sinus infection, unless your healthcare provider tells you to. Rinsing may spread the infection to other areas in your sinuses.    Use acetaminophen or ibuprofen to control pain, unless another pain medicine was prescribed to you. If you have chronic liver or kidney disease or ever had a stomach ulcer, talk with your healthcare provider before using these medicines. Never give aspirin to anyone under age 18 who is ill with a fever. It may cause severe liver damage.    Don't smoke. This can make symptoms worse.    Follow-up care  Follow up with your healthcare provider, or as advised.   When to seek medical advice  Call your healthcare provider if any of these occur:     Facial pain or headache that gets worse    Stiff neck    Unusual drowsiness or confusion    Swelling of your forehead or eyelids    Symptoms don't go away in 10 days    Vision problems, such as blurred or double vision    Fever of 100.4 F (38 C) or higher, or as directed by your healthcare provider  Call 911  Call 911 if any of these occur:     Seizure    Trouble breathing    Feeling dizzy or faint    Fingernails, skin or lips look blue, purple , or gray  Prevention  Here are steps you can take to help prevent an infection:     Keep good hand washing habits.    Don t have close contact with people who have sore throats, colds, or other upper respiratory infections.    Don t smoke, and stay away from secondhand smoke.    Stay up to date with of your vaccines.  [employer] last reviewed this educational content on 12/1/2019 2000-2021 [employer]. All rights reserved. This  information is not intended as a substitute for professional medical care. Always follow your healthcare professional's instructions.        \    Clyde Montemayor,    Thanks for the follow up message. Sorry to hear your symptoms have persisted. Since we recently discussed your symptoms, I will send in an antibiotic and you will not be charged for this evisit.    It is important that you take?all of?your prescribed medication even if your symptoms are improving after a few doses.? Taking?all of?your medicine helps prevent the symptoms from returning.?     If your symptoms worsen, you develop severe headache, vomiting, high fever (>102), or are not improving in 7 days, please contact your primary care provider for an appointment or visit any of our convenient Walk-in Care or Urgent Care Centers to be seen which can be found on our website?here.?     Thanks again for choosing?us?as your health care partner,?   ?  Ingrid Blackwood, DO?

## 2023-03-08 ENCOUNTER — MYC MEDICAL ADVICE (OUTPATIENT)
Dept: FAMILY MEDICINE | Facility: CLINIC | Age: 43
End: 2023-03-08
Payer: COMMERCIAL

## 2023-03-08 DIAGNOSIS — U07.1 INFECTION DUE TO 2019 NOVEL CORONAVIRUS: Primary | ICD-10-CM

## 2023-03-08 NOTE — TELEPHONE ENCOUNTER
RN COVID TREATMENT VISIT  03/08/23      The patient has been triaged and does not require a higher level of care.     Shady Rocha  42 year old  Current weight? 297 lb    Has the patient been seen by a primary care provider at an [facility] or [facility] within the past two years? Yes.   Have you been in close proximity to/do you have a known exposure to a person with a confirmed case of influenza? No.     General treatment eligibility:  Date of positive COVID test (PCR or at home)?  3/8/23    Are you or have you been hospitalized for this COVID-19 infection? No.   Have you received monoclonal antibodies or antiviral treatment for COVID-19 since this positive test? No.   Do you have any of the following conditions that place you at risk of being very sick from COVID-19?   - Overweight or Obesity (BMI >85th percentile or BMI 25 or higher)  Yes, patient has at least one high risk condition as noted above.     Current COVID symptoms:   - fever or chills  - fatigue  - headache  - congestion or runny nose  Yes. Patient has at least one symptom as selected.     How many days since symptoms started? 5 days or less. Established patient, 12 years or older weighing at least 88.2 lbs, who has symptoms that started in the past 5 days, has not been hospitalized nor received treatment already, and is at risk for being very sick from COVID-19.     Treatment eligibility by RN:    Are you currently pregnant or nursing? No    Do you have a clinically significant hypersensitivity to nirmatrelvir or ritonavir, or toxic epidermal necrolysis (TEN) or Cuello-Marco Syndrome? No    Do you have a history of hepatitis, any hepatic impairment on the Problem List (such as Child-Caldera Class C, cirrhosis, fatty liver disease, alcoholic liver disease), or was the last liver lab (hepatic panel, ALT, AST, ALK Phos, bilirubin) elevated in the past 6 months? No    Do you have any history of severe renal impairment  (eGFR < 30mL/min)? No    Is patient eligible to continue?   Yes, patient meets all eligibility requirements for the RN COVID treatment (as denoted by all no responses above).     Current Outpatient Medications   Medication Sig Dispense Refill     losartan (COZAAR) 100 MG tablet TAKE 1 TABLET BY MOUTH DAILY FOR BLOOD PRESSURE 90 tablet 2     omeprazole (PRILOSEC) 20 MG DR capsule Take 1 capsule (20 mg) by mouth daily as needed       tadalafil (CIALIS) 10 MG tablet Take 10 mg by mouth daily as needed         Medications from List 1 of the standing order (on medications that exclude the use of Paxlovid) that patient is taking: NONE. Is patient taking Manda's Wort? No  Is patient taking [place]'s Wort or any meds from List 1? No.   Medications from List 2 of the standing order (on meds that provider needs to adjust) that patient is taking: NONE. Is patient on any of the meds from List 2? No.   Medications from List 3 of standing order (on meds that a RN needs to adjust) that patient is taking: tadalafil (Adcirca, Alyq, Cialis, Tadliq): Is patient using for erectile dysfunction? Yes, instructed patient to stop taking tadalafil while taking Paxlovid and restart tadalafil 3 days after the completion of Paxlovid. Is patient on any meds from List 3? Yes. Patient is on meds from list 3. No meds require a provider visit and at least one med required RN to adjust.     Paxlovid has an approximate 90% reduction in hospitalization. Paxlovid can possibly cause altered sense of taste, diarrhea (loose, watery stools), high blood pressure, muscle aches.     Would patient like a Paxlovid prescription?   Yes.   Lab Results   Component Value Date    GFRESTIMATED 88 10/31/2022       Was last eGFR reduced? No, eGFR 60 or greater/ No Result on record. Patient can receive the normal renal function dose. Paxlovid Rx sent to [place] pharmacy   [facility]    Temporary change to home medications: Patient will stop using  tadalafil while taking PAxlovid and will wait until 3 days AFTER completion of Paxlovid prior to restarting tadalafil.    All medication adjustments (holds, etc) were discussed with the patient and patient was asked to repeat back (teachback) their med adjustment.  Did patient understand med adjustment? Yes, patient repeated back and understood correctly.        Reviewed the following instructions with the patient:    Paxlovid (nimatrelvir and ritonavir)    How it works  Two medicines (nirmatrelvir and ritonavir) are taken together. They stop the virus from growing. Less amount of virus is easier for your body to fight.    How to take    Medicine comes in a daily container with both medicine tablets. Take by mouth twice daily (once in the morning, once at night) for 5 days.    The number of tablets to take varies by patient.    Don't chew or break capsules. Swallow whole.    When to take  Take as soon as possible after positive COVID-19 test result, and within 5 days of your first symptoms.    Possible side effects  Can cause altered sense of taste, diarrhea (loose, watery stools), high blood pressure, muscle aches.    Oralia Negrete, RN

## 2023-10-07 DIAGNOSIS — I10 BENIGN ESSENTIAL HYPERTENSION: ICD-10-CM

## 2023-10-10 RX ORDER — LOSARTAN POTASSIUM 100 MG/1
TABLET ORAL
Qty: 90 TABLET | Refills: 0 | Status: SHIPPED | OUTPATIENT
Start: 2023-10-10 | End: 2024-01-08

## 2023-10-30 ASSESSMENT — SLEEP AND FATIGUE QUESTIONNAIRES
HOW LIKELY ARE YOU TO NOD OFF OR FALL ASLEEP WHILE SITTING INACTIVE IN A PUBLIC PLACE: WOULD NEVER DOZE
HOW LIKELY ARE YOU TO NOD OFF OR FALL ASLEEP WHILE SITTING QUIETLY AFTER LUNCH WITHOUT ALCOHOL: WOULD NEVER DOZE
HOW LIKELY ARE YOU TO NOD OFF OR FALL ASLEEP WHILE WATCHING TV: SLIGHT CHANCE OF DOZING
HOW LIKELY ARE YOU TO NOD OFF OR FALL ASLEEP WHEN YOU ARE A PASSENGER IN A CAR FOR AN HOUR WITHOUT A BREAK: WOULD NEVER DOZE
HOW LIKELY ARE YOU TO NOD OFF OR FALL ASLEEP WHILE SITTING AND READING: SLIGHT CHANCE OF DOZING
HOW LIKELY ARE YOU TO NOD OFF OR FALL ASLEEP IN A CAR, WHILE STOPPED FOR A FEW MINUTES IN TRAFFIC: WOULD NEVER DOZE
HOW LIKELY ARE YOU TO NOD OFF OR FALL ASLEEP WHILE SITTING AND TALKING TO SOMEONE: WOULD NEVER DOZE
HOW LIKELY ARE YOU TO NOD OFF OR FALL ASLEEP WHILE LYING DOWN TO REST IN THE AFTERNOON WHEN CIRCUMSTANCES PERMIT: SLIGHT CHANCE OF DOZING

## 2023-10-31 ENCOUNTER — OFFICE VISIT (OUTPATIENT)
Dept: SLEEP MEDICINE | Facility: CLINIC | Age: 43
End: 2023-10-31
Payer: COMMERCIAL

## 2023-10-31 VITALS
HEART RATE: 67 BPM | OXYGEN SATURATION: 97 % | WEIGHT: 307 LBS | SYSTOLIC BLOOD PRESSURE: 130 MMHG | BODY MASS INDEX: 40.5 KG/M2 | DIASTOLIC BLOOD PRESSURE: 83 MMHG

## 2023-10-31 DIAGNOSIS — G47.33 OSA (OBSTRUCTIVE SLEEP APNEA): Primary | ICD-10-CM

## 2023-10-31 DIAGNOSIS — I10 PRIMARY HYPERTENSION: ICD-10-CM

## 2023-10-31 PROCEDURE — 99214 OFFICE O/P EST MOD 30 MIN: CPT | Performed by: INTERNAL MEDICINE

## 2023-10-31 NOTE — NURSING NOTE
"Chief Complaint   Patient presents with    CPAP Follow Up     Bi-annual        Initial /83   Pulse 67   Wt 139.3 kg (307 lb)   SpO2 97%   BMI 40.50 kg/m   Estimated body mass index is 40.5 kg/m  as calculated from the following:    Height as of 10/31/22: 1.854 m (6' 1\").    Weight as of this encounter: 139.3 kg (307 lb).    Medication Reconciliation: complete    Neck circumference:     DME: [facility]      "

## 2023-10-31 NOTE — PROGRESS NOTES
Additional 10 minutes on the date of service was spent performing the following:    -Preparing to see the patient  -Ordering medications, tests, or procedures   -Documenting clinical information in the electronic or other health record     Thank you for the opportunity to participate in the care of Shady Rocha.     He is a 43 year old y/o male patient who comes to the sleep medicine clinic for follow up. The patient was diagnosed with JOSE at our facility on 10/21/19 (SIQ=997.8).  The patient states that he continues to benefit from CPAP therapy and has no complaints.     Assessment and Plan:  In summary Shady Rocha is a 43 year old year old male who is here for follow up.    1. JOSE (obstructive sleep apnea)  I congratulated patient on his excellent CPAP usage.  I will keep him on the same pressure settings for now.  Return to clinic biannually.  - COMPREHENSIVE DME    2. Primary hypertension  Informed the patient about the relationship between untreated JOSE and HTN. Will give the patient a handout on HTN in the AVS.    Compliance Download data for 30 days:  Compliance: 100%  Pressure setting: APAP 7-13.4 CWP  95% pressure: 11.2 CWP  Leak: Minimal  Residual AHI: 3.5 events per hour  Mask Tolerance: Good  Skin irritation: None  DME: [facility]    Lab reviewed: Discussed with patient.    Cpap Fu Template    Question 10/30/2023  1:00 PM CDT - Filed by Patient   Do you use a CPAP Machine at home? Yes   Overall, on a scale of 0-10 how would you rate your CPAP? 10   Is your mask comfortable? Yes   Is your mask leaking? No   Do you notice snoring with mask on? No   Do you notice gasping arousals with mask on? No   Are you having significant oral or nasal dryness? No   Is the pressure setting comfortable? Yes   What type of mask do you use? Nasal Pillow   What is your typical bedtime? 11pm   How long does it take you to go to sleep on PAP therapy? under 5mins   What time do you typically get out of bed for  "the day? 7am   How many hours on average per night are you using PAP therapy? 8   How many hours are you sleeping per night? 8   Do you feel well rested in the morning? Yes       SORAIDA:  SORAIDA Total Score: 7  Total score - [place]: 3 (10/30/2023  1:01 PM)       Patient Active Problem List   Diagnosis    Hypertension    GERD (gastroesophageal reflux disease)    Adrenal adenoma    Drug-induced erectile dysfunction    Obesity (BMI 35.0-39.9) with comorbidity (H)    JOSE (obstructive sleep apnea)    Mixed hyperlipidemia    RBBB    Prediabetes       Past Medical History:   Diagnosis Date    Gastroesophageal reflux disease without esophagitis        Past Surgical History:   Procedure Laterality Date    DENTAL SURGERY         Current Outpatient Medications   Medication Sig Dispense Refill    losartan (COZAAR) 100 MG tablet TAKE 1 TABLET BY MOUTH DAILY FOR BLOOD PRESSURE 90 tablet 0    omeprazole (PRILOSEC) 20 MG DR capsule Take 1 capsule (20 mg) by mouth daily as needed      tadalafil (CIALIS) 10 MG tablet Take 10 mg by mouth daily as needed         Allergies   Allergen Reactions    Sulfa (Sulfonamide Antibiotics) [Sulfa Antibiotics] Unknown       Physical Exam:  /83   Pulse 67   Wt 139.3 kg (307 lb)   SpO2 97%   BMI 40.50 kg/m    BMI:Body mass index is 40.5 kg/m .   GEN: NAD, morbidly obese  Head: Normocephalic.  EYES: EOMI  Psych: normal mood, normal affect    Labs/Studies:      No results found for: \"PH\", \"PHARTERIAL\", \"PO2\", \"[id number]\", \"SAT\", \"PCO2\", \"HCO3\", \"BASEEXCESS\", \"BRIAN\", \"BEB\"  No results found for: \"TSH\"  Lab Results   Component Value Date     (H) 10/31/2022    GLC 86 10/25/2021     Lab Results   Component Value Date    HGB 14.2 10/25/2021    HGB 14.7 09/09/2019     Lab Results   Component Value Date    BUN 13.0 10/31/2022    BUN 13 10/25/2021    CR 1.07 10/31/2022    CR 1.12 10/25/2021     Lab Results   Component Value Date    AST 31 10/31/2022    AST 28 10/25/2021    ALT 40 10/31/2022    ALT " "34 10/25/2021    ALKPHOS 60 10/31/2022    ALKPHOS 52 10/25/2021    BILITOTAL 0.3 10/31/2022    BILITOTAL 0.7 10/25/2021     No results found for: \"UAMP\", \"UBARB\", \"BENZODIAZEUR\", \"UCANN\", \"UCOC\", \"OPIT\", \"UPCP\"    Recent Labs   Lab Test 10/31/22  0843 10/25/21  1351    137   POTASSIUM 4.3 4.0   CHLORIDE 105 104   CO2 25 22   ANIONGAP 11 11   * 86   BUN 13.0 13   CR 1.07 1.12   ROSALIA 9.1 9.6       No results found for: \"ELAN\"    I reviewed the efficacy and compliance report from his device. Data summarized on the HPI and the PAP compliance flow sheet.     Patient verbalized understanding of these issues, agrees with the plan and all questions were answered today. Patient was given an opportuntity to voice any other symptoms or concerns not listed above. Patient did not have any other symptoms or concerns.      Dominic Solano DO  Board Certified in Internal Medicine and Sleep Medicine    (Note created with Dragon voice recognition and unintended spelling errors and word substitutions may occur)     Audio and visual devices were used for this virtual clinic visit with permission from patient.    "

## 2023-10-31 NOTE — NURSING NOTE
Return in about 2 year reminder created and to be send via [employer].       Isela Yeboah AP  [facility]

## 2023-12-16 ENCOUNTER — HEALTH MAINTENANCE LETTER (OUTPATIENT)
Age: 43
End: 2023-12-16

## 2024-01-08 DIAGNOSIS — I10 BENIGN ESSENTIAL HYPERTENSION: ICD-10-CM

## 2024-01-12 RX ORDER — LOSARTAN POTASSIUM 100 MG/1
100 TABLET ORAL DAILY
Qty: 90 TABLET | Refills: 0 | Status: SHIPPED | OUTPATIENT
Start: 2024-01-12 | End: 2024-04-11

## 2024-03-21 ENCOUNTER — OFFICE VISIT (OUTPATIENT)
Dept: FAMILY MEDICINE | Facility: CLINIC | Age: 44
End: 2024-03-21
Payer: COMMERCIAL

## 2024-03-21 VITALS
HEIGHT: 74 IN | OXYGEN SATURATION: 96 % | SYSTOLIC BLOOD PRESSURE: 116 MMHG | TEMPERATURE: 97.9 F | BODY MASS INDEX: 39.53 KG/M2 | WEIGHT: 308 LBS | RESPIRATION RATE: 16 BRPM | HEART RATE: 69 BPM | DIASTOLIC BLOOD PRESSURE: 83 MMHG

## 2024-03-21 DIAGNOSIS — N52.2 DRUG-INDUCED ERECTILE DYSFUNCTION: ICD-10-CM

## 2024-03-21 DIAGNOSIS — E66.01 MORBID OBESITY (H): ICD-10-CM

## 2024-03-21 DIAGNOSIS — Z00.00 ROUTINE GENERAL MEDICAL EXAMINATION AT A HEALTH CARE FACILITY: Primary | ICD-10-CM

## 2024-03-21 DIAGNOSIS — R73.03 PREDIABETES: ICD-10-CM

## 2024-03-21 DIAGNOSIS — K42.9 UMBILICAL HERNIA WITHOUT OBSTRUCTION AND WITHOUT GANGRENE: ICD-10-CM

## 2024-03-21 DIAGNOSIS — I10 BENIGN ESSENTIAL HYPERTENSION: ICD-10-CM

## 2024-03-21 DIAGNOSIS — Z12.11 COLON CANCER SCREENING: ICD-10-CM

## 2024-03-21 DIAGNOSIS — E78.2 MIXED HYPERLIPIDEMIA: ICD-10-CM

## 2024-03-21 LAB
ERYTHROCYTE [DISTWIDTH] IN BLOOD BY AUTOMATED COUNT: 12.6 % (ref 10–15)
HBA1C MFR BLD: 6.1 % (ref 0–5.6)
HCT VFR BLD AUTO: 43.6 % (ref 40–53)
HGB BLD-MCNC: 14.7 G/DL (ref 13.3–17.7)
MCH RBC QN AUTO: 28.8 PG (ref 26.5–33)
MCHC RBC AUTO-ENTMCNC: 33.7 G/DL (ref 31.5–36.5)
MCV RBC AUTO: 86 FL (ref 78–100)
PLATELET # BLD AUTO: 263 10E3/UL (ref 150–450)
RBC # BLD AUTO: 5.1 10E6/UL (ref 4.4–5.9)
WBC # BLD AUTO: 5.7 10E3/UL (ref 4–11)

## 2024-03-21 PROCEDURE — 80061 LIPID PANEL: CPT | Performed by: FAMILY MEDICINE

## 2024-03-21 PROCEDURE — 83036 HEMOGLOBIN GLYCOSYLATED A1C: CPT | Performed by: FAMILY MEDICINE

## 2024-03-21 PROCEDURE — 99214 OFFICE O/P EST MOD 30 MIN: CPT | Mod: 25 | Performed by: FAMILY MEDICINE

## 2024-03-21 PROCEDURE — 99396 PREV VISIT EST AGE 40-64: CPT | Performed by: FAMILY MEDICINE

## 2024-03-21 PROCEDURE — 85027 COMPLETE CBC AUTOMATED: CPT | Performed by: FAMILY MEDICINE

## 2024-03-21 PROCEDURE — 36415 COLL VENOUS BLD VENIPUNCTURE: CPT | Performed by: FAMILY MEDICINE

## 2024-03-21 PROCEDURE — 80053 COMPREHEN METABOLIC PANEL: CPT | Performed by: FAMILY MEDICINE

## 2024-03-21 RX ORDER — TADALAFIL 10 MG/1
10 TABLET ORAL DAILY PRN
Qty: 12 TABLET | Refills: 1 | Status: SHIPPED | OUTPATIENT
Start: 2024-03-21

## 2024-03-21 SDOH — HEALTH STABILITY: PHYSICAL HEALTH: ON AVERAGE, HOW MANY DAYS PER WEEK DO YOU ENGAGE IN MODERATE TO STRENUOUS EXERCISE (LIKE A BRISK WALK)?: 4 DAYS

## 2024-03-21 ASSESSMENT — SOCIAL DETERMINANTS OF HEALTH (SDOH): HOW OFTEN DO YOU GET TOGETHER WITH FRIENDS OR RELATIVES?: THREE TIMES A WEEK

## 2024-03-21 NOTE — PATIENT INSTRUCTIONS
Preventive Care Advice   This is general advice given by our system to help you stay healthy. However, your care team may have specific advice just for you. Please talk to your care team about your preventive care needs.  Nutrition  Eat 5 or more servings of fruits and vegetables each day.  Try wheat bread, brown rice and whole grain pasta (instead of white bread, rice, and pasta).  Get enough calcium and vitamin D. Check the label on foods and aim for 100% of the RDA (recommended daily allowance).  Lifestyle  Exercise at least 150 minutes each week   (30 minutes a day, 5 days a week).  Do muscle strengthening activities 2 days a week. These help control your weight and prevent disease.  No smoking.  Wear sunscreen to prevent skin cancer.  Have a dental exam and cleaning every 6 months.  Yearly exams  See your health care team every year to talk about:  Any changes in your health.  Any medicines your care team has prescribed.  Preventive care, family planning, and ways to prevent chronic diseases.  Shots (vaccines)   HPV shots (up to age 26), if you've never had them before.  Hepatitis B shots (up to age 59), if you've never had them before.  COVID-19 shot: Get this shot when it's due.  Flu shot: Get a flu shot every year.  Tetanus shot: Get a tetanus shot every 10 years.  Pneumococcal, hepatitis A, and RSV shots: Ask your care team if you need these based on your risk.  Shingles shot (for age 50 and up).  General health tests  Diabetes screening:  Starting at age 35, Get screened for diabetes at least every 3 years.  If you are younger than age 35, ask your care team if you should be screened for diabetes.  Cholesterol test: At age 39, start having a cholesterol test every 5 years, or more often if advised.  Bone density scan (DEXA): At age 50, ask your care team if you should have this scan for osteoporosis (brittle bones).  Hepatitis C: Get tested at least once in your life.  STIs (sexually transmitted  infections)  Before age 24: Ask your care team if you should be screened for STIs.  After age 24: Get screened for STIs if you're at risk. You are at risk for STIs (including HIV) if:  You are sexually active with more than one person.  You don't use condoms every time.  You or a partner was diagnosed with a sexually transmitted infection.  If you are at risk for HIV, ask about PrEP medicine to prevent HIV.  Get tested for HIV at least once in your life, whether you are at risk for HIV or not.  Cancer screening tests  Cervical cancer screening: If you have a cervix, begin getting regular cervical cancer screening tests at age 21. Most people who have regular screenings with normal results can stop after age 65. Talk about this with your provider.  Breast cancer scan (mammogram): If you've ever had breasts, begin having regular mammograms starting at age 40. This is a scan to check for breast cancer.  Colon cancer screening: It is important to start screening for colon cancer at age 45.  Have a colonoscopy test every 10 years (or more often if you're at risk) Or, ask your provider about stool tests like a FIT test every year or Cologuard test every 3 years.  To learn more about your testing options, visit: https://www.[employer]/020483.pdf.  For help making a decision, visit: https://bit.ly/ix65694.  Prostate cancer screening test: If you have a prostate and are age 55 to 69, ask your provider if you would benefit from a yearly prostate cancer screening test.  Lung cancer screening: If you are a current or former smoker age 50 to 80, ask your care team if ongoing lung cancer screenings are right for you.  For informational purposes only. Not to replace the advice of your health care provider. Copyright   2023 [employer]. All rights reserved. Clinically reviewed by the [facility] Transitions Program. [employer] [id number] - REV 01/24.
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
## 2024-03-22 LAB
ALBUMIN SERPL BCG-MCNC: 4.4 G/DL (ref 3.5–5.2)
ALP SERPL-CCNC: 55 U/L (ref 40–150)
ALT SERPL W P-5'-P-CCNC: 44 U/L (ref 0–70)
ANION GAP SERPL CALCULATED.3IONS-SCNC: 10 MMOL/L (ref 7–15)
AST SERPL W P-5'-P-CCNC: 37 U/L (ref 0–45)
BILIRUB SERPL-MCNC: 0.2 MG/DL
BUN SERPL-MCNC: 12.4 MG/DL (ref 6–20)
CALCIUM SERPL-MCNC: 9.1 MG/DL (ref 8.6–10)
CHLORIDE SERPL-SCNC: 106 MMOL/L (ref 98–107)
CHOLEST SERPL-MCNC: 186 MG/DL
CREAT SERPL-MCNC: 1.02 MG/DL (ref 0.67–1.17)
DEPRECATED HCO3 PLAS-SCNC: 24 MMOL/L (ref 22–29)
EGFRCR SERPLBLD CKD-EPI 2021: >90 ML/MIN/1.73M2
FASTING STATUS PATIENT QL REPORTED: YES
GLUCOSE SERPL-MCNC: 105 MG/DL (ref 70–99)
HDLC SERPL-MCNC: 38 MG/DL
LDLC SERPL CALC-MCNC: 124 MG/DL
NONHDLC SERPL-MCNC: 148 MG/DL
POTASSIUM SERPL-SCNC: 4.3 MMOL/L (ref 3.4–5.3)
PROT SERPL-MCNC: 7 G/DL (ref 6.4–8.3)
SODIUM SERPL-SCNC: 140 MMOL/L (ref 135–145)
TRIGL SERPL-MCNC: 119 MG/DL

## 2024-03-27 ENCOUNTER — MYC MEDICAL ADVICE (OUTPATIENT)
Dept: FAMILY MEDICINE | Facility: CLINIC | Age: 44
End: 2024-03-27
Payer: COMMERCIAL

## 2024-03-27 DIAGNOSIS — Z12.11 SCREENING FOR COLON CANCER: Primary | ICD-10-CM

## 2024-03-27 NOTE — TELEPHONE ENCOUNTER
"See Verast[employer]t message from [facility] 3/27/24:  \"Mayuri Caruso,  We apologize, we were unsuccessful reaching you by phone. Our records indicate that you have not scheduled your Lower Endoscopy [Colonoscopy] procedure, as recommended by your care team.  Scheduling is able to offer options at 9 locations.  Unfortunately, [facility] is booked.  Your ordering provider would like you to consider our additional locations to avoid any delay in your care.  Our closest locations are in [place], [place], or [place].  If you wish to schedule with [facility] please contact us at:      [facility] Scheduling  Phone: [phone] option 2  Hours: 7am-5pm, Monday-Friday    If you have chosen to schedule elsewhere or if you have already made an appointment, please disregard this letter.  Sincerely,  [facility]- [facility] Scheduling\"  "

## 2024-04-03 ENCOUNTER — MYC MEDICAL ADVICE (OUTPATIENT)
Dept: FAMILY MEDICINE | Facility: CLINIC | Age: 44
End: 2024-04-03
Payer: COMMERCIAL

## 2024-04-11 DIAGNOSIS — I10 BENIGN ESSENTIAL HYPERTENSION: ICD-10-CM

## 2024-04-11 RX ORDER — LOSARTAN POTASSIUM 100 MG/1
TABLET ORAL
Qty: 90 TABLET | Refills: 2 | Status: SHIPPED | OUTPATIENT
Start: 2024-04-11

## 2024-07-10 ENCOUNTER — TRANSFERRED RECORDS (OUTPATIENT)
Dept: HEALTH INFORMATION MANAGEMENT | Facility: CLINIC | Age: 44
End: 2024-07-10
Payer: COMMERCIAL

## 2024-12-17 ENCOUNTER — MYC MEDICAL ADVICE (OUTPATIENT)
Dept: SLEEP MEDICINE | Facility: CLINIC | Age: 44
End: 2024-12-17
Payer: COMMERCIAL

## 2024-12-17 DIAGNOSIS — G47.33 OSA (OBSTRUCTIVE SLEEP APNEA): Primary | ICD-10-CM

## 2024-12-20 ENCOUNTER — MYC MEDICAL ADVICE (OUTPATIENT)
Dept: FAMILY MEDICINE | Facility: CLINIC | Age: 44
End: 2024-12-20
Payer: COMMERCIAL

## 2025-01-07 DIAGNOSIS — I10 BENIGN ESSENTIAL HYPERTENSION: ICD-10-CM

## 2025-01-07 RX ORDER — LOSARTAN POTASSIUM 100 MG/1
TABLET ORAL
Qty: 90 TABLET | Refills: 0 | Status: SHIPPED | OUTPATIENT
Start: 2025-01-07

## 2025-03-07 ENCOUNTER — TRANSFERRED RECORDS (OUTPATIENT)
Dept: HEALTH INFORMATION MANAGEMENT | Facility: CLINIC | Age: 45
End: 2025-03-07
Payer: COMMERCIAL

## 2025-03-19 SDOH — HEALTH STABILITY: PHYSICAL HEALTH: ON AVERAGE, HOW MANY DAYS PER WEEK DO YOU ENGAGE IN MODERATE TO STRENUOUS EXERCISE (LIKE A BRISK WALK)?: 3 DAYS

## 2025-03-19 SDOH — HEALTH STABILITY: PHYSICAL HEALTH: ON AVERAGE, HOW MANY MINUTES DO YOU ENGAGE IN EXERCISE AT THIS LEVEL?: 40 MIN

## 2025-03-19 ASSESSMENT — SOCIAL DETERMINANTS OF HEALTH (SDOH): HOW OFTEN DO YOU GET TOGETHER WITH FRIENDS OR RELATIVES?: TWICE A WEEK

## 2025-03-24 ENCOUNTER — OFFICE VISIT (OUTPATIENT)
Dept: FAMILY MEDICINE | Facility: CLINIC | Age: 45
End: 2025-03-24
Attending: FAMILY MEDICINE
Payer: COMMERCIAL

## 2025-03-24 VITALS
HEIGHT: 73 IN | OXYGEN SATURATION: 97 % | WEIGHT: 310.8 LBS | TEMPERATURE: 98 F | HEART RATE: 73 BPM | DIASTOLIC BLOOD PRESSURE: 99 MMHG | BODY MASS INDEX: 41.19 KG/M2 | RESPIRATION RATE: 16 BRPM | SYSTOLIC BLOOD PRESSURE: 141 MMHG

## 2025-03-24 DIAGNOSIS — Z00.00 ROUTINE GENERAL MEDICAL EXAMINATION AT A HEALTH CARE FACILITY: Primary | ICD-10-CM

## 2025-03-24 DIAGNOSIS — I10 BENIGN ESSENTIAL HYPERTENSION: ICD-10-CM

## 2025-03-24 DIAGNOSIS — E78.2 MIXED HYPERLIPIDEMIA: ICD-10-CM

## 2025-03-24 DIAGNOSIS — R10.32 LLQ ABDOMINAL PAIN: ICD-10-CM

## 2025-03-24 DIAGNOSIS — R73.03 PREDIABETES: ICD-10-CM

## 2025-03-24 DIAGNOSIS — E66.01 MORBID OBESITY (H): ICD-10-CM

## 2025-03-24 LAB
ERYTHROCYTE [DISTWIDTH] IN BLOOD BY AUTOMATED COUNT: 12.7 % (ref 10–15)
EST. AVERAGE GLUCOSE BLD GHB EST-MCNC: 128 MG/DL
HBA1C MFR BLD: 6.1 % (ref 0–5.6)
HCT VFR BLD AUTO: 43.6 % (ref 40–53)
HGB BLD-MCNC: 14.7 G/DL (ref 13.3–17.7)
MCH RBC QN AUTO: 28.8 PG (ref 26.5–33)
MCHC RBC AUTO-ENTMCNC: 33.7 G/DL (ref 31.5–36.5)
MCV RBC AUTO: 85 FL (ref 78–100)
PLATELET # BLD AUTO: 258 10E3/UL (ref 150–450)
RBC # BLD AUTO: 5.11 10E6/UL (ref 4.4–5.9)
WBC # BLD AUTO: 6.1 10E3/UL (ref 4–11)

## 2025-03-24 PROCEDURE — 3075F SYST BP GE 130 - 139MM HG: CPT | Performed by: FAMILY MEDICINE

## 2025-03-24 PROCEDURE — 83036 HEMOGLOBIN GLYCOSYLATED A1C: CPT | Performed by: FAMILY MEDICINE

## 2025-03-24 PROCEDURE — 80061 LIPID PANEL: CPT | Performed by: FAMILY MEDICINE

## 2025-03-24 PROCEDURE — 99214 OFFICE O/P EST MOD 30 MIN: CPT | Mod: 25 | Performed by: FAMILY MEDICINE

## 2025-03-24 PROCEDURE — 3080F DIAST BP >= 90 MM HG: CPT | Performed by: FAMILY MEDICINE

## 2025-03-24 PROCEDURE — 80053 COMPREHEN METABOLIC PANEL: CPT | Performed by: FAMILY MEDICINE

## 2025-03-24 PROCEDURE — 85027 COMPLETE CBC AUTOMATED: CPT | Performed by: FAMILY MEDICINE

## 2025-03-24 PROCEDURE — G2211 COMPLEX E/M VISIT ADD ON: HCPCS | Performed by: FAMILY MEDICINE

## 2025-03-24 PROCEDURE — 36415 COLL VENOUS BLD VENIPUNCTURE: CPT | Performed by: FAMILY MEDICINE

## 2025-03-24 PROCEDURE — 99396 PREV VISIT EST AGE 40-64: CPT | Performed by: FAMILY MEDICINE

## 2025-03-24 RX ORDER — LOSARTAN POTASSIUM 100 MG/1
100 TABLET ORAL DAILY
Qty: 90 TABLET | Refills: 3 | Status: SHIPPED | OUTPATIENT
Start: 2025-03-24

## 2025-03-24 NOTE — PATIENT INSTRUCTIONS
Patient Education   Preventive Care Advice   This is general advice given by our system to help you stay healthy. However, your care team may have specific advice just for you. Please talk to your care team about your preventive care needs.  Nutrition  Eat 5 or more servings of fruits and vegetables each day.  Try wheat bread, brown rice and whole grain pasta (instead of white bread, rice, and pasta).  Get enough calcium and vitamin D. Check the label on foods and aim for 100% of the RDA (recommended daily allowance).  Lifestyle  Exercise at least 150 minutes each week  (30 minutes a day, 5 days a week).  Do muscle strengthening activities 2 days a week. These help control your weight and prevent disease.  No smoking.  Wear sunscreen to prevent skin cancer.  Have a dental exam and cleaning every 6 months.  Yearly exams  See your health care team every year to talk about:  Any changes in your health.  Any medicines your care team has prescribed.  Preventive care, family planning, and ways to prevent chronic diseases.  Shots (vaccines)   HPV shots (up to age 26), if you've never had them before.  Hepatitis B shots (up to age 59), if you've never had them before.  COVID-19 shot: Get this shot when it's due.  Flu shot: Get a flu shot every year.  Tetanus shot: Get a tetanus shot every 10 years.  Pneumococcal, hepatitis A, and RSV shots: Ask your care team if you need these based on your risk.  Shingles shot (for age 50 and up)  General health tests  Diabetes screening:  Starting at age 35, Get screened for diabetes at least every 3 years.  If you are younger than age 35, ask your care team if you should be screened for diabetes.  Cholesterol test: At age 39, start having a cholesterol test every 5 years, or more often if advised.  Bone density scan (DEXA): At age 50, ask your care team if you should have this scan for osteoporosis (brittle bones).  Hepatitis C: Get tested at least once in your life.  STIs (sexually  transmitted infections)  Before age 24: Ask your care team if you should be screened for STIs.  After age 24: Get screened for STIs if you're at risk. You are at risk for STIs (including HIV) if:  You are sexually active with more than one person.  You don't use condoms every time.  You or a partner was diagnosed with a sexually transmitted infection.  If you are at risk for HIV, ask about PrEP medicine to prevent HIV.  Get tested for HIV at least once in your life, whether you are at risk for HIV or not.  Cancer screening tests  Cervical cancer screening: If you have a cervix, begin getting regular cervical cancer screening tests starting at age 21.  Breast cancer scan (mammogram): If you've ever had breasts, begin having regular mammograms starting at age 40. This is a scan to check for breast cancer.  Colon cancer screening: It is important to start screening for colon cancer at age 45.  Have a colonoscopy test every 10 years (or more often if you're at risk) Or, ask your provider about stool tests like a FIT test every year or Cologuard test every 3 years.  To learn more about your testing options, visit:   .  For help making a decision, visit:   https://bit.ly/ga70087.  Prostate cancer screening test: If you have a prostate, ask your care team if a prostate cancer screening test (PSA) at age 55 is right for you.  Lung cancer screening: If you are a current or former smoker ages 50 to 80, ask your care team if ongoing lung cancer screenings are right for you.  For informational purposes only. Not to replace the advice of your health care provider. Copyright   2023 [employer]. All rights reserved. Clinically reviewed by the [facility]itions [facility]. [employer] 211738 - REV 01/24.

## 2025-03-24 NOTE — PROGRESS NOTES
"Preventive Care Visit  [facility]  Ingrid DO Jaison, Family Medicine  Mar 24, 2025      Assessment & Plan     Routine general medical examination at a health care facility  Counseling  Appropriate preventive services were addressed with this patient via screening, questionnaire, or discussion as appropriate for fall prevention, nutrition, physical activity, social engagement, weight loss and cognition.  Checklist reviewing preventive services available has been given to the patient.  Reviewed patient's diet, addressing concerns and/or questions.   He is at risk for lack of exercise and has been provided with information to increase physical activity for the benefit of his well-being.   - CBC with platelets; Future    Benign essential hypertension  Initially elevated, improved on recheck.  Update monitoring labs today.  Losartan 100 mg daily refilled  - losartan (COZAAR) 100 MG tablet; Take 1 tablet (100 mg) by mouth daily.    Mixed hyperlipidemia  Reassess fasting labs and determine ASCVD risk  - Lipid panel reflex to direct LDL Non-fasting; Future  - Comprehensive metabolic panel (BMP + Alb, Alk Phos, ALT, AST, Total. Bili, TP); Future    Prediabetes  Hemoglobin A1c previously 6.1%.  Update A1c today, discussed carbohydrate/simple sugar reduction and weight loss  - Hemoglobin A1c; Future    Morbid obesity (H)  BMI  Estimated body mass index is 40.53 kg/m  as calculated from the following:    Height as of this encounter: 1.865 m (6' 1.43\").    Weight as of this encounter: 141 kg (310 lb 12.8 oz).   Weight management plan: Discussed healthy diet and exercise guidelines  He will let us know if interested in nutrition versus weight loss specialty referral    LLQ abdominal pain  Occasional, mild left lower quadrant pain with unknown trigger.  Patient denies GI associated symptoms, and unable to appreciate palpable mass on exam today.  Possible early inguinal hernia so we discussed weight " loss, improving core strength, and monitoring for change.  Consider ultrasound if symptoms increase in frequency or worsen.  Discussed return precautions.    Patient has been advised of split billing requirements and indicates understanding: Yes    The longitudinal plan of care for the diagnosis(es)/condition(s) as documented were addressed during this visit. Due to the added complexity in care, I will continue to support Sim in the subsequent management and with ongoing continuity of care.      Subjective   Sim is a 45 year old, presenting for the following:  Annual Visit (Fasting - labs, no concerns)        3/24/2025     9:43 AM   Additional Questions   Roomed by Zuly REBOLLEDO CMA   Accompanied by self          HPI    Occasional left lower quadrant pain.  No palpable bulge.  Unable to pinpoint when it occurs, possibly due to movement.  No change in symptoms with eating.  No GI concerns.    Advance Care Planning  Patient does not have a Health Care Directive: Patient states has Advance Directive and will bring in a copy to clinic.      3/19/2025   General Health   How would you rate your overall physical health? (!) FAIR   Feel stress (tense, anxious, or unable to sleep) Only a little   (!) STRESS CONCERN      3/19/2025   Nutrition   Three or more servings of calcium each day? Yes   Diet: Regular (no restrictions)   How many servings of fruit and vegetables per day? (!) 0-1   How many sweetened beverages each day? 0-1         3/19/2025   Exercise   Days per week of moderate/strenous exercise 3 days   Average minutes spent exercising at this level 40 min         3/19/2025   Social Factors   Frequency of gathering with friends or relatives Twice a week   Worry food won't last until get money to buy more No   Food not last or not have enough money for food? No   Do you have housing? (Housing is defined as stable permanent housing and does not include staying ouside in a car, in a tent, in an abandoned building, in an  "overnight shelter, or couch-surfing.) Yes   Are you worried about losing your housing? No   Lack of transportation? No   Unable to get utilities (heat,electricity)? No         3/19/2025   Dental   Dentist two times every year? Yes           3/21/2024   TB Screening   Were you born outside of the [place]? No           Today's PHQ-2 Score:       3/24/2025     9:40 AM   PHQ-2 ( 1999 [employer])   Q1: Little interest or pleasure in doing things 0   Q2: Feeling down, depressed or hopeless 0   PHQ-2 Score 0    Q1: Little interest or pleasure in doing things Not at all   Q2: Feeling down, depressed or hopeless Not at all   PHQ-2 Score 0       Patient-reported           3/19/2025   Substance Use   Alcohol more than 3/day or more than 7/wk No   Do you use any other substances recreationally? No     Social History     Tobacco Use    Smoking status: Never     Passive exposure: Past    Smokeless tobacco: Never   Substance Use Topics    Alcohol use: Yes     Comment: 2-3 times a week    Drug use: Never           3/19/2025   STI Screening   New sexual partner(s) since last STI/HIV test? No   ASCVD Risk   The 10-year ASCVD risk score (Omayra HARDING, et al., 2019) is: 3.9%    Values used to calculate the score:      Age: 45 years      Sex: Male      Is Non- : No      Diabetic: No      Tobacco smoker: No      Systolic Blood Pressure: 142 mmHg      Is BP treated: Yes      HDL Cholesterol: 38 mg/dL      Total Cholesterol: 186 mg/dL        3/19/2025   Contraception/Family Planning   Questions about contraception or family planning No        Reviewed and updated as needed this visit by Provider   Tobacco  Allergies  Meds  Problems  Med Hx  Surg Hx  Fam Hx               Objective    Exam  BP (!) 142/97   Pulse 69   Temp 98  F (36.7  C) (Oral)   Resp 16   Ht 1.865 m (6' 1.43\")   Wt (!) 141 kg (310 lb 12.8 oz)   SpO2 97%   BMI 40.53 kg/m     Estimated body mass index is 40.53 kg/m  as calculated from the " "following:    Height as of this encounter: 1.865 m (6' 1.43\").    Weight as of this encounter: 141 kg (310 lb 12.8 oz).    Physical Exam  GENERAL: alert and no distress  EYES: Eyes grossly normal to inspection, PERRL and conjunctivae and sclerae normal  HENT: ear canals and TM's normal, nose and mouth without ulcers or lesions  NECK: no adenopathy, no asymmetry, masses, or scars  RESP: lungs clear to auscultation - no rales, rhonchi or wheezes  CV: regular rate and rhythm, normal S1 S2, no S3 or S4, no murmur, click or rub, 1+ peripheral edema bilaterally to lower shin  ABDOMEN: Obese, soft, faint left lower quadrant pain with deep palpation, no rebound tenderness or guarding  MS: no gross musculoskeletal defects noted, no edema  SKIN: no suspicious lesions or rashes  NEURO: Normal strength and tone, mentation intact and speech normal  PSYCH: mentation appears normal, affect normal/bright        Signed Electronically by: Ingrid Blackwood DO    "

## 2025-03-25 LAB
ALBUMIN SERPL BCG-MCNC: 4.3 G/DL (ref 3.5–5.2)
ALP SERPL-CCNC: 58 U/L (ref 40–150)
ALT SERPL W P-5'-P-CCNC: 40 U/L (ref 0–70)
ANION GAP SERPL CALCULATED.3IONS-SCNC: 10 MMOL/L (ref 7–15)
AST SERPL W P-5'-P-CCNC: 29 U/L (ref 0–45)
BILIRUB SERPL-MCNC: 0.3 MG/DL
BUN SERPL-MCNC: 12.8 MG/DL (ref 6–20)
CALCIUM SERPL-MCNC: 9.2 MG/DL (ref 8.8–10.4)
CHLORIDE SERPL-SCNC: 106 MMOL/L (ref 98–107)
CHOLEST SERPL-MCNC: 188 MG/DL
CREAT SERPL-MCNC: 1.11 MG/DL (ref 0.67–1.17)
EGFRCR SERPLBLD CKD-EPI 2021: 83 ML/MIN/1.73M2
FASTING STATUS PATIENT QL REPORTED: YES
FASTING STATUS PATIENT QL REPORTED: YES
GLUCOSE SERPL-MCNC: 105 MG/DL (ref 70–99)
HCO3 SERPL-SCNC: 25 MMOL/L (ref 22–29)
HDLC SERPL-MCNC: 39 MG/DL
LDLC SERPL CALC-MCNC: 131 MG/DL
NONHDLC SERPL-MCNC: 149 MG/DL
POTASSIUM SERPL-SCNC: 4.3 MMOL/L (ref 3.4–5.3)
PROT SERPL-MCNC: 6.9 G/DL (ref 6.4–8.3)
SODIUM SERPL-SCNC: 141 MMOL/L (ref 135–145)
TRIGL SERPL-MCNC: 91 MG/DL